# Patient Record
Sex: FEMALE | Race: OTHER | HISPANIC OR LATINO | Employment: FULL TIME | URBAN - METROPOLITAN AREA
[De-identification: names, ages, dates, MRNs, and addresses within clinical notes are randomized per-mention and may not be internally consistent; named-entity substitution may affect disease eponyms.]

---

## 2018-07-10 ENCOUNTER — HOSPITAL ENCOUNTER (INPATIENT)
Facility: HOSPITAL | Age: 38
LOS: 2 days | Discharge: HOME | DRG: 463 | End: 2018-07-12
Attending: EMERGENCY MEDICINE | Admitting: UROLOGY
Payer: COMMERCIAL

## 2018-07-10 ENCOUNTER — APPOINTMENT (EMERGENCY)
Dept: RADIOLOGY | Facility: HOSPITAL | Age: 38
DRG: 463 | End: 2018-07-10
Payer: COMMERCIAL

## 2018-07-10 DIAGNOSIS — N20.0 LEFT NEPHROLITHIASIS: Primary | ICD-10-CM

## 2018-07-10 DIAGNOSIS — N20.1 LEFT URETERAL STONE: ICD-10-CM

## 2018-07-10 DIAGNOSIS — N39.0 UTI (URINARY TRACT INFECTION): ICD-10-CM

## 2018-07-10 DIAGNOSIS — N13.30 HYDRONEPHROSIS: ICD-10-CM

## 2018-07-10 LAB
ALBUMIN SERPL BCP-MCNC: 3.9 G/DL (ref 3.5–5)
ALP SERPL-CCNC: 82 U/L (ref 46–116)
ALT SERPL W P-5'-P-CCNC: 20 U/L (ref 12–78)
ANION GAP SERPL CALCULATED.3IONS-SCNC: 3 MMOL/L (ref 4–13)
AST SERPL W P-5'-P-CCNC: 14 U/L (ref 5–45)
BACTERIA UR QL AUTO: ABNORMAL /HPF
BASOPHILS # BLD AUTO: 0.03 THOUSANDS/ΜL (ref 0–0.1)
BASOPHILS NFR BLD AUTO: 0 % (ref 0–1)
BILIRUB SERPL-MCNC: 0.47 MG/DL (ref 0.2–1)
BILIRUB UR QL STRIP: NEGATIVE
BUN SERPL-MCNC: 17 MG/DL (ref 5–25)
CALCIUM SERPL-MCNC: 9.5 MG/DL (ref 8.3–10.1)
CHLORIDE SERPL-SCNC: 102 MMOL/L (ref 100–108)
CLARITY UR: CLEAR
CO2 SERPL-SCNC: 27 MMOL/L (ref 21–32)
COLOR UR: YELLOW
COLOR, POC: NORMAL
CREAT SERPL-MCNC: 1.52 MG/DL (ref 0.6–1.3)
EOSINOPHIL # BLD AUTO: 0.05 THOUSAND/ΜL (ref 0–0.61)
EOSINOPHIL NFR BLD AUTO: 0 % (ref 0–6)
ERYTHROCYTE [DISTWIDTH] IN BLOOD BY AUTOMATED COUNT: 13.2 % (ref 11.6–15.1)
EXT PREG TEST URINE: NORMAL
GFR SERPL CREATININE-BSD FRML MDRD: 43 ML/MIN/1.73SQ M
GLUCOSE SERPL-MCNC: 101 MG/DL (ref 65–140)
GLUCOSE UR STRIP-MCNC: NEGATIVE MG/DL
HCT VFR BLD AUTO: 45.1 % (ref 34.8–46.1)
HGB BLD-MCNC: 14.5 G/DL (ref 11.5–15.4)
HGB UR QL STRIP.AUTO: ABNORMAL
IMM GRANULOCYTES # BLD AUTO: 0.02 THOUSAND/UL (ref 0–0.2)
IMM GRANULOCYTES NFR BLD AUTO: 0 % (ref 0–2)
KETONES UR STRIP-MCNC: NEGATIVE MG/DL
LEUKOCYTE ESTERASE UR QL STRIP: ABNORMAL
LYMPHOCYTES # BLD AUTO: 1.03 THOUSANDS/ΜL (ref 0.6–4.47)
LYMPHOCYTES NFR BLD AUTO: 9 % (ref 14–44)
MCH RBC QN AUTO: 29.7 PG (ref 26.8–34.3)
MCHC RBC AUTO-ENTMCNC: 32.2 G/DL (ref 31.4–37.4)
MCV RBC AUTO: 92 FL (ref 82–98)
MONOCYTES # BLD AUTO: 0.75 THOUSAND/ΜL (ref 0.17–1.22)
MONOCYTES NFR BLD AUTO: 6 % (ref 4–12)
NEUTROPHILS # BLD AUTO: 9.75 THOUSANDS/ΜL (ref 1.85–7.62)
NEUTS SEG NFR BLD AUTO: 85 % (ref 43–75)
NITRITE UR QL STRIP: POSITIVE
NON-SQ EPI CELLS URNS QL MICRO: ABNORMAL /HPF
NRBC BLD AUTO-RTO: 0 /100 WBCS
PH UR STRIP.AUTO: 6 [PH] (ref 4.5–8)
PLATELET # BLD AUTO: 237 THOUSANDS/UL (ref 149–390)
PMV BLD AUTO: 10.4 FL (ref 8.9–12.7)
POTASSIUM SERPL-SCNC: 3.7 MMOL/L (ref 3.5–5.3)
PROT SERPL-MCNC: 8.9 G/DL (ref 6.4–8.2)
PROT UR STRIP-MCNC: ABNORMAL MG/DL
RBC # BLD AUTO: 4.89 MILLION/UL (ref 3.81–5.12)
RBC #/AREA URNS AUTO: ABNORMAL /HPF
SODIUM SERPL-SCNC: 132 MMOL/L (ref 136–145)
SP GR UR STRIP.AUTO: 1.02 (ref 1–1.03)
UROBILINOGEN UR QL STRIP.AUTO: 0.2 E.U./DL
WBC # BLD AUTO: 11.63 THOUSAND/UL (ref 4.31–10.16)
WBC #/AREA URNS AUTO: ABNORMAL /HPF

## 2018-07-10 PROCEDURE — 87186 SC STD MICRODIL/AGAR DIL: CPT

## 2018-07-10 PROCEDURE — 81025 URINE PREGNANCY TEST: CPT | Performed by: EMERGENCY MEDICINE

## 2018-07-10 PROCEDURE — 99285 EMERGENCY DEPT VISIT HI MDM: CPT

## 2018-07-10 PROCEDURE — 80053 COMPREHEN METABOLIC PANEL: CPT | Performed by: EMERGENCY MEDICINE

## 2018-07-10 PROCEDURE — 81001 URINALYSIS AUTO W/SCOPE: CPT

## 2018-07-10 PROCEDURE — 85025 COMPLETE CBC W/AUTO DIFF WBC: CPT | Performed by: EMERGENCY MEDICINE

## 2018-07-10 PROCEDURE — 71046 X-RAY EXAM CHEST 2 VIEWS: CPT

## 2018-07-10 PROCEDURE — 96374 THER/PROPH/DIAG INJ IV PUSH: CPT

## 2018-07-10 PROCEDURE — 87147 CULTURE TYPE IMMUNOLOGIC: CPT

## 2018-07-10 PROCEDURE — 74176 CT ABD & PELVIS W/O CONTRAST: CPT

## 2018-07-10 PROCEDURE — 87086 URINE CULTURE/COLONY COUNT: CPT

## 2018-07-10 PROCEDURE — 99254 IP/OBS CNSLTJ NEW/EST MOD 60: CPT | Performed by: UROLOGY

## 2018-07-10 PROCEDURE — 96375 TX/PRO/DX INJ NEW DRUG ADDON: CPT

## 2018-07-10 PROCEDURE — 36415 COLL VENOUS BLD VENIPUNCTURE: CPT | Performed by: EMERGENCY MEDICINE

## 2018-07-10 PROCEDURE — 87077 CULTURE AEROBIC IDENTIFY: CPT

## 2018-07-10 RX ORDER — MORPHINE SULFATE 4 MG/ML
4 INJECTION, SOLUTION INTRAMUSCULAR; INTRAVENOUS ONCE
Status: COMPLETED | OUTPATIENT
Start: 2018-07-10 | End: 2018-07-10

## 2018-07-10 RX ORDER — KETOROLAC TROMETHAMINE 30 MG/ML
30 INJECTION, SOLUTION INTRAMUSCULAR; INTRAVENOUS ONCE
Status: DISCONTINUED | OUTPATIENT
Start: 2018-07-10 | End: 2018-07-10

## 2018-07-10 RX ORDER — ONDANSETRON 2 MG/ML
4 INJECTION INTRAMUSCULAR; INTRAVENOUS ONCE
Status: COMPLETED | OUTPATIENT
Start: 2018-07-10 | End: 2018-07-10

## 2018-07-10 RX ORDER — SODIUM CHLORIDE 9 MG/ML
100 INJECTION, SOLUTION INTRAVENOUS CONTINUOUS
Status: DISCONTINUED | OUTPATIENT
Start: 2018-07-10 | End: 2018-07-12 | Stop reason: HOSPADM

## 2018-07-10 RX ORDER — KETOROLAC TROMETHAMINE 30 MG/ML
30 INJECTION, SOLUTION INTRAMUSCULAR; INTRAVENOUS ONCE
Status: COMPLETED | OUTPATIENT
Start: 2018-07-10 | End: 2018-07-10

## 2018-07-10 RX ORDER — IBUPROFEN 400 MG/1
400 TABLET ORAL EVERY 6 HOURS PRN
COMMUNITY

## 2018-07-10 RX ORDER — ONDANSETRON HYDROCHLORIDE 4 MG/5ML
4 SOLUTION ORAL ONCE
Status: DISCONTINUED | OUTPATIENT
Start: 2018-07-10 | End: 2018-07-10

## 2018-07-10 RX ORDER — ACETAMINOPHEN 325 MG/1
650 TABLET ORAL EVERY 6 HOURS PRN
Status: DISCONTINUED | OUTPATIENT
Start: 2018-07-10 | End: 2018-07-12 | Stop reason: HOSPADM

## 2018-07-10 RX ORDER — HYDROCODONE BITARTRATE AND ACETAMINOPHEN 5; 325 MG/1; MG/1
1 TABLET ORAL EVERY 6 HOURS PRN
Status: DISCONTINUED | OUTPATIENT
Start: 2018-07-10 | End: 2018-07-12 | Stop reason: HOSPADM

## 2018-07-10 RX ORDER — ONDANSETRON 2 MG/ML
INJECTION INTRAMUSCULAR; INTRAVENOUS
Status: COMPLETED
Start: 2018-07-10 | End: 2018-07-10

## 2018-07-10 RX ORDER — ONDANSETRON 2 MG/ML
4 INJECTION INTRAMUSCULAR; INTRAVENOUS EVERY 6 HOURS PRN
Status: DISCONTINUED | OUTPATIENT
Start: 2018-07-10 | End: 2018-07-12 | Stop reason: HOSPADM

## 2018-07-10 RX ORDER — SODIUM CHLORIDE, SODIUM LACTATE, POTASSIUM CHLORIDE, CALCIUM CHLORIDE 600; 310; 30; 20 MG/100ML; MG/100ML; MG/100ML; MG/100ML
100 INJECTION, SOLUTION INTRAVENOUS CONTINUOUS
Status: DISCONTINUED | OUTPATIENT
Start: 2018-07-10 | End: 2018-07-10

## 2018-07-10 RX ADMIN — HYDROCODONE BITARTRATE AND ACETAMINOPHEN 1 TABLET: 5; 325 TABLET ORAL at 20:02

## 2018-07-10 RX ADMIN — KETOROLAC TROMETHAMINE 30 MG: 30 INJECTION, SOLUTION INTRAMUSCULAR at 11:30

## 2018-07-10 RX ADMIN — CEFTRIAXONE 1000 MG: 1 INJECTION, POWDER, FOR SOLUTION INTRAMUSCULAR; INTRAVENOUS at 17:06

## 2018-07-10 RX ADMIN — SODIUM CHLORIDE, SODIUM LACTATE, POTASSIUM CHLORIDE, AND CALCIUM CHLORIDE 100 ML/HR: .6; .31; .03; .02 INJECTION, SOLUTION INTRAVENOUS at 17:04

## 2018-07-10 RX ADMIN — ONDANSETRON 4 MG: 2 INJECTION INTRAMUSCULAR; INTRAVENOUS at 11:30

## 2018-07-10 RX ADMIN — MORPHINE SULFATE 4 MG: 4 INJECTION INTRAVENOUS at 14:28

## 2018-07-10 RX ADMIN — SODIUM CHLORIDE 100 ML/HR: 0.9 INJECTION, SOLUTION INTRAVENOUS at 20:02

## 2018-07-10 NOTE — ED ATTENDING ATTESTATION
Cici Ferrell MD, saw and evaluated the patient  All available labs and X-rays were ordered by me or the resident and have been reviewed by myself  I discussed the patient with the resident / non-physician and agree with the resident's / non-physician practitioner's findings and plan as documented in the resident's / non-physician practicitioner's note, except where noted  At this point, I agree with the current assessment done in the ED  Chief Complaint   Patient presents with    Flank Pain     Patient reports nausea/vomiting and left sided flank pain for the past week  Patient has a h/o of kidney stones and states this feels similar  Patient also reports increased urinary frequency but denies dysuria/hematuria  This is a 28-year-old female presenting for evaluation of left flank pain  The patient states that the past 1 week she has been having nausea, vomiting, left flank pain  It has been very painful, rating anteriorly  Feels like previous kidney stone  Denies any fevers chills chest pain shortness of breath  Today because she was vomiting at work her boss sent her in for further evaluation  Denies diarrhea or constipation  Denies falls or injuries  Denies heavy lifting  She last had a kidney stone a long time ago and felt very similar  Denies any other major medical problems  PE:  Vitals:    07/10/18 1902 07/10/18 1903 07/10/18 2246 07/11/18 0728   BP: 114/66  119/66 117/63   BP Location: Right arm  Right arm Right arm   Pulse: 83  80 67   Resp: 16  16 16   Temp: 100 1 °F (37 8 °C)  98 9 °F (37 2 °C) 97 8 °F (36 6 °C)   TempSrc: Oral  Oral Oral   SpO2: 98%  97% 98%   Weight:  95 2 kg (209 lb 15 8 oz)     Height:  5' 1" (1 549 m)     General: VSS, NAD, awake, alert  Well-nourished, well-developed  Appears stated age  Speaking normally in full sentences  Head: Normocephalic, atraumatic, nontender  Eyes: PERRL, EOM-I  No diplopia  No hyphema     No subconjunctival hemorrhages  Symmetrical lids  ENT: Atraumatic external nose and ears  MMM  No malocclusion  No stridor  Normal phonation  No drooling  Normal swallowing  Neck: Symmetric, trachea midline  No JVD  CV: RRR  +S1/S2  No murmurs or gallops  Peripheral pulses +2 throughout  No chest wall tenderness  Lungs:   Unlabored No retractions  CTAB, lungs sounds equal bilateral    No tachypnea  Abd: +BS, soft, NT/ND    MSK:   FROM   Back:   No rashes  Left flank tenderness (mild)  Skin: Dry, intact  Neuro: AAOx3, GCS 15, CN II-XII grossly intact  Motor grossly intact  Psychiatric/Behavioral: Appropriate mood and affect   Exam: deferred  A:  - UTI  - Stone  P:  - CT for location of stone  - labs  - 13 point ROS was performed and all are normal unless stated in the history above  - Nursing note reviewed  Vitals reviewed  - Orders placed by myself and/or advanced practitioner / resident     - Previous chart was reviewed  - No language barrier    - History obtained from patient  - There are no limitations to the history obtained  - Critical care time: Not applicable for this patient  Final Diagnosis:  1  Left nephrolithiasis    2  UTI (urinary tract infection)    3  Hydronephrosis    4  Left ureteral stone      ED Course as of Jul 11 1255   Tue Jul 10, 2018   1104 Bacteria, UA: (!) Innumerable   1104 WBC, UA: (!) Innumerable   1104 Leukocytes, UA: (!) Small   1104 Nitrite, UA: (!) Positive   1233 Infected kidney stone with UTI and ZUHAIR  Will discuss with urology  Bert Goldberg Discussed case with Dr Henna Chambers  He will admit    Would expect >2 midnight stay in hospital for an infected kidney stone necessitating retrieval        Medications   sodium chloride 0 9 % infusion (100 mL/hr Intravenous New Bag 7/11/18 0717)   HYDROmorphone (DILAUDID) injection 1 mg (1 mg Intravenous Given 7/11/18 0719)   acetaminophen (TYLENOL) tablet 650 mg (not administered)   ceFAZolin (ANCEF) IVPB (premix) 2,000 mg (0 mg Intravenous Stopped 7/11/18 0921)   ondansetron (ZOFRAN) injection 4 mg (4 mg Intravenous Given 7/11/18 1058)   HYDROcodone-acetaminophen (NORCO) 5-325 mg per tablet 1 tablet (1 tablet Oral Given 7/11/18 1056)   ondansetron (ZOFRAN) injection 4 mg (4 mg Intravenous Given 7/10/18 1130)   ketorolac (TORADOL) injection 30 mg (30 mg Intravenous Given 7/10/18 1130)   morphine (PF) 4 mg/mL injection 4 mg (4 mg Intravenous Given 7/10/18 1428)   ceftriaxone (ROCEPHIN) 1 g/50 mL in dextrose IVPB (0 mg Intravenous Stopped 7/10/18 1809)     CT renal stone study abdomen pelvis wo contrast   ED Interpretation   CT ordered by myself and the report from radiologist has been reviewed  Final Result      5 mm calculus left UPJ with mild obstructive uropathy  No perinephric collection  5 mm and less left renal calyceal calculi  5 mm calculus right renal pelvis  No hydronephrosis  2 mm calculus upper pole right kidney  Mild stranding adjacent to right renal pelvis and proximal left ureter  There is minimal thickening of the bladder wall which may simply reflect under distention  Correlate with urinalysis to exclude nonspecific cystitis and ascending pyeloureteritis  Workstation performed: [id number]         XR chest 2 views   ED Interpretation   The CXR was ordered by resident and interpreted by me independently  On my read, it appears normal without acute abnormalities  Final Result      No acute cardiopulmonary disease  Workstation performed: [id number]           Orders Placed This Encounter   Procedures    Urine culture    XR chest 2 views    CT renal stone study abdomen pelvis wo contrast    Urine Microscopic    CBC and differential    Comprehensive metabolic panel    Diet NPO; Sips with meds    Nursing communcation Continue IV as ordered     Leda Swenson Notify admitting physician    Notify admitting physician on arrival    Nursing oxygen orders / instructions    Activity as tolerated  Bathroom privileges    Strain all urine    Activity as tolerated    Inpatient consult to Urology    Inpatient consult to Case Management    POCT pregnancy, urine    POCT urinalysis dipstick    Inpatient Admission (expected length of stay for this patient is greater than two midnights)    Inpatient Admission     Labs Reviewed   URINE MICROSCOPIC - Abnormal        Result Value Ref Range Status    RBC, UA 4-10 (*) None Seen, 0-5 /hpf Final    WBC, UA Innumerable (*) None Seen, 0-5, 5-55, 5-65 /hpf Final    Epithelial Cells Occasional  None Seen, Occasional /hpf Final    Bacteria, UA Innumerable (*) None Seen, Occasional /hpf Final   CBC AND DIFFERENTIAL - Abnormal     WBC 11 63 (*) 4 31 - 10 16 Thousand/uL Final    RBC 4 89  3 81 - 5 12 Million/uL Final    Hemoglobin 14 5  11 5 - 15 4 g/dL Final    Hematocrit 45 1  34 8 - 46 1 % Final    MCV 92  82 - 98 fL Final    MCH 29 7  26 8 - 34 3 pg Final    MCHC 32 2  31 4 - 37 4 g/dL Final    RDW 13 2  11 6 - 15 1 % Final    MPV 10 4  8 9 - 12 7 fL Final    Platelets 523  355 - 390 Thousands/uL Final    nRBC 0  /100 WBCs Final    Neutrophils Relative 85 (*) 43 - 75 % Final    Immat GRANS % 0  0 - 2 % Final    Lymphocytes Relative 9 (*) 14 - 44 % Final    Monocytes Relative 6  4 - 12 % Final    Eosinophils Relative 0  0 - 6 % Final    Basophils Relative 0  0 - 1 % Final    Neutrophils Absolute 9 75 (*) 1 85 - 7 62 Thousands/µL Final    Immature Grans Absolute 0 02  0 00 - 0 20 Thousand/uL Final    Lymphocytes Absolute 1 03  0 60 - 4 47 Thousands/µL Final    Monocytes Absolute 0 75  0 17 - 1 22 Thousand/µL Final    Eosinophils Absolute 0 05  0 00 - 0 61 Thousand/µL Final    Basophils Absolute 0 03  0 00 - 0 10 Thousands/µL Final   COMPREHENSIVE METABOLIC PANEL - Abnormal     Sodium 132 (*) 136 - 145 mmol/L Final    Potassium 3 7  3 5 - 5 3 mmol/L Final    Chloride 102  100 - 108 mmol/L Final    CO2 27  21 - 32 mmol/L Final    Anion Gap 3 (*) 4 - 13 mmol/L Final BUN 17  5 - 25 mg/dL Final    Creatinine 1 52 (*) 0 60 - 1 30 mg/dL Final    Comment: Standardized to IDMS reference method    Glucose 101  65 - 140 mg/dL Final    Comment:   If the patient is fasting, the ADA then defines impaired fasting glucose as > 100 mg/dL and diabetes as > or equal to 123 mg/dL  Specimen collection should occur prior to Sulfasalazine administration due to the potential for falsely depressed results  Specimen collection should occur prior to Sulfapyridine administration due to the potential for falsely elevated results  Calcium 9 5  8 3 - 10 1 mg/dL Final    AST 14  5 - 45 U/L Final    Comment:   Specimen collection should occur prior to Sulfasalazine administration due to the potential for falsely depressed results  ALT 20  12 - 78 U/L Final    Comment:   Specimen collection should occur prior to Sulfasalazine and/or Sulfapyridine administration due to the potential for falsely depressed results  Alkaline Phosphatase 82  46 - 116 U/L Final    Total Protein 8 9 (*) 6 4 - 8 2 g/dL Final    Albumin 3 9  3 5 - 5 0 g/dL Final    Total Bilirubin 0 47  0 20 - 1 00 mg/dL Final    eGFR 43  ml/min/1 73sq m Final    Narrative:     National Kidney Disease Education Program recommendations are as follows:  GFR calculation is accurate only with a steady state creatinine  Chronic Kidney disease less than 60 ml/min/1 73 sq  meters  Kidney failure less than 15 ml/min/1 73 sq  meters     ED URINE MACROSCOPIC - Abnormal     Color, UA Yellow   Final    Clarity, UA Clear   Final    pH, UA 6 0  4 5 - 8 0 Final    Leukocytes, UA Small (*) Negative Final    Nitrite, UA Positive (*) Negative Final    Protein,  (2+) (*) Negative mg/dl Final    Glucose, UA Negative  Negative mg/dl Final    Ketones, UA Negative  Negative mg/dl Final    Urobilinogen, UA 0 2  0 2, 1 0 E U /dl E U /dl Final    Bilirubin, UA Negative  Negative Final    Blood, UA Large (*) Negative Final    Specific Gravity, UA 1 025  1 003 - 1 030 Final    Narrative:     CLINITEK RESULT   POCT PREGNANCY, URINE - Normal    EXT PREG TEST UR (Ref: Negative) negative (-)   Final   POCT URINALYSIS DIPSTICK - Normal    Color, UA see chart   Final     Time reflects when diagnosis was documented in both MDM as applicable and the Disposition within this note     Time User Action Codes Description Comment    7/10/2018  1:39 PM Taryn Allis Add [N20 0] Left nephrolithiasis     7/10/2018  1:39 PM Taryn Allis Add [N39 0] UTI (urinary tract infection)     7/10/2018  1:40 PM Taryn Allis Add [N13 30] Hydronephrosis     7/10/2018  4:46 PM Debra Castorena Add [N20 1] Left ureteral stone       ED Disposition     ED Disposition Condition Comment    Admit  Case was discussed with Urology and the patient's admission status was agreed to be Admission Status: inpatient status to the service of Dr Lachelle Cruz  Follow-up Information    None       Current Discharge Medication List      CONTINUE these medications which have NOT CHANGED    Details   ibuprofen (MOTRIN) 400 mg tablet Take 400 mg by mouth every 6 (six) hours as needed for mild pain (takes 1-2 as needed)           No discharge procedures on file  Prior to Admission Medications   Prescriptions Last Dose Informant Patient Reported? Taking?   ibuprofen (MOTRIN) 400 mg tablet   Yes Yes   Sig: Take 400 mg by mouth every 6 (six) hours as needed for mild pain (takes 1-2 as needed)      Facility-Administered Medications: None       Portions of the record may have been created with voice recognition software  Occasional wrong word or "sound a like" substitutions may have occurred due to the inherent limitations of voice recognition software  Read the chart carefully and recognize, using context, where substitutions have occurred      Electronically signed by:  Beverley Mcnair

## 2018-07-10 NOTE — CONSULTS
Consultation - Urology   Elo Morales 45 y o  female MRN: [id number]  Unit/Bed#: ED 25 Encounter: [id number]      Assessment/Plan      Assessment:  5 mm proximal UPJ stone on the left, with probable UTI    Plan:  Plan cystoscopy, left ureteral stent placement as staged procedure tomorrow  NPO after midnight  I have explained to her the procedure, the risks of bleeding infection and damage to the urinary tract, and the fact that this is a staged procedure and she will need further ureteroscopy and laser lithotripsy after the infection is cleared  This for decompression only  She has signed informed consent  History of Present Illness   Attending: Ana Paula Brown MD  Reason for Consult / Principal Problem:  Left flank pain, left ureteral calculus  HPI: Elo Morales is a 45y o  year old female who presents with left flank pain  She has had nausea and vomiting and left flank pain for the past week  She has a history of kidney stones  CT scan shows upon my personal review a 5 mm stone in the left UPJ, some stones in the left kidney that are 5 mm and less in size, and a 5 mm nonobstructing stone in the right renal pelvis  Urinalysis shows positive leukocytes, positive nitrites, innumerable bacteria and white blood cells indicative of a urinary tract infection      Inpatient consult to Urology  Consult performed by: Manish Dural  Consult ordered by: Layla Emanuel          Review of Systems    Historical Information   Past Medical History:   Diagnosis Date    Kidney stones      Past Surgical History:   Procedure Laterality Date    TUBAL LIGATION       Social History   History   Alcohol Use No     History   Drug Use No     History   Smoking Status    Current Every Day Smoker    Packs/day: 1 00    Types: Cigarettes   Smokeless Tobacco    Never Used     Family History: non-contributory    Meds/Allergies   all current active meds have been reviewed, current meds:   No current facility-administered medications for this encounter  and PTA meds:   Prior to Admission Medications   Prescriptions Last Dose Informant Patient Reported? Taking?   ibuprofen (MOTRIN) 400 mg tablet   Yes Yes   Sig: Take 400 mg by mouth every 6 (six) hours as needed for mild pain (takes 1-2 as needed)      Facility-Administered Medications: None     No Known Allergies    Objective   Vitals: Blood pressure 109/67, pulse 64, temperature 99 1 °F (37 3 °C), temperature source Tympanic, resp  rate 16, weight 99 8 kg (220 lb), last menstrual period 07/03/2018, SpO2 98 %  No intake/output data recorded  Invasive Devices     Peripheral Intravenous Line            Peripheral IV 07/10/18 Left Antecubital less than 1 day                Physical Exam   Constitutional: She is oriented to person, place, and time  She appears well-developed and well-nourished  HENT:   Head: Normocephalic and atraumatic  Eyes: EOM are normal    Neck: Normal range of motion  Cardiovascular: Normal rate and regular rhythm  Pulmonary/Chest: Effort normal  No respiratory distress  She has no wheezes  Abdominal: She exhibits no distension  There is no tenderness  Musculoskeletal: Normal range of motion  Neurological: She is alert and oriented to person, place, and time  Skin: Skin is warm and dry  Psychiatric: She has a normal mood and affect  Her behavior is normal  Judgment and thought content normal        Lab Results:   I have personally reviewed pertinent reports      CBC:   Lab Results   Component Value Date    WBC 11 63 (H) 07/10/2018    HGB 14 5 07/10/2018    HCT 45 1 07/10/2018    MCV 92 07/10/2018     07/10/2018    MCH 29 7 07/10/2018    MCHC 32 2 07/10/2018    RDW 13 2 07/10/2018    MPV 10 4 07/10/2018    NRBC 0 07/10/2018     CMP:   Lab Results   Component Value Date     (L) 07/10/2018     07/10/2018    CO2 27 07/10/2018    ANIONGAP 3 (L) 07/10/2018    BUN 17 07/10/2018    CREATININE 1 52 (H) 07/10/2018    GLUCOSE 101 07/10/2018    CALCIUM 9 5 07/10/2018    AST 14 07/10/2018    ALT 20 07/10/2018    ALKPHOS 82 07/10/2018    PROT 8 9 (H) 07/10/2018    BILITOT 0 47 07/10/2018    EGFR 43 07/10/2018     Imaging Studies: I have personally reviewed pertinent reports  and I have personally reviewed pertinent films in PACS  EKG, Pathology, and Other Studies: I have personally reviewed pertinent reports      VTE Prophylaxis: Sequential compression device Zeinab An)     Code Status: No Order  Advance Directive and Living Will:      Power of :    POLST:

## 2018-07-10 NOTE — ED PROVIDER NOTES
History  Chief Complaint   Patient presents with    Flank Pain     Patient reports nausea/vomiting and left sided flank pain for the past week  Patient has a h/o of kidney stones and states this feels similar  Patient also reports increased urinary frequency but denies dysuria/hematuria  Patient is a 45year old female with a history of kidney stones requiring urological retrieval who presents with L sided flank pain  Patient tells me 5 days ago she started to experience L-sided cramping non-radiating flank pain  The past two days she has had associated nausea and vomiting, subjective fevers/chills, urinary urgency, urinary frequency  She denies dysuria and visible hematuria  She has had decreased PO intake the past several days  She has also been experiencing URI symptoms the past week including non-productive cough, dyspnea, and rhinorrhea  She denies chest pain  She tells me there is a chance there is a retained tampon in her vaginal vault from last week  Denies any foul-smelling dischrage  Prior to Admission Medications   Prescriptions Last Dose Informant Patient Reported? Taking?   ibuprofen (MOTRIN) 400 mg tablet   Yes Yes   Sig: Take 400 mg by mouth every 6 (six) hours as needed for mild pain (takes 1-2 as needed)      Facility-Administered Medications: None       Past Medical History:   Diagnosis Date    Kidney stones        Past Surgical History:   Procedure Laterality Date    TUBAL LIGATION         History reviewed  No pertinent family history  I have reviewed and agree with the history as documented  Social History   Substance Use Topics    Smoking status: Current Every Day Smoker     Packs/day: 1 00     Types: Cigarettes    Smokeless tobacco: Never Used    Alcohol use No        Review of Systems   Constitutional: Positive for appetite change, chills and fever  Negative for activity change, diaphoresis and fatigue  HENT: Positive for congestion      Eyes: Negative for photophobia and redness  Respiratory: Positive for cough and shortness of breath  Negative for chest tightness and wheezing  Cardiovascular: Negative for chest pain  Gastrointestinal: Positive for nausea and vomiting  Negative for abdominal distention, abdominal pain and diarrhea  Genitourinary: Positive for flank pain, frequency and urgency  Negative for dysuria, hematuria and pelvic pain  Skin: Negative for color change, pallor and rash  Neurological: Negative for weakness, light-headedness and numbness  Psychiatric/Behavioral: Negative for agitation and confusion  All other systems reviewed and are negative  Physical Exam  ED Triage Vitals [07/10/18 0939]   Temperature Pulse Respirations Blood Pressure SpO2   99 1 °F (37 3 °C) 94 18 124/74 98 %      Temp Source Heart Rate Source Patient Position - Orthostatic VS BP Location FiO2 (%)   Tympanic Monitor Sitting Right arm --      Pain Score       9           Orthostatic Vital Signs  Vitals:    07/10/18 1430 07/10/18 1532 07/10/18 1600 07/10/18 1707   BP: 111/64 109/67 108/63 103/67   Pulse: 71 64 84 81   Patient Position - Orthostatic VS:    Sitting       Physical Exam   Constitutional: She is oriented to person, place, and time  She appears well-developed and well-nourished  No distress  HENT:   Head: Normocephalic  Eyes: Pupils are equal, round, and reactive to light  Neck: Normal range of motion  Neck supple  Cardiovascular: Normal rate, regular rhythm, normal heart sounds and intact distal pulses  Pulmonary/Chest: Effort normal and breath sounds normal    Abdominal: Soft  Bowel sounds are normal  She exhibits no distension  There is no tenderness  There is no guarding  Musculoskeletal: Normal range of motion  She exhibits no edema, tenderness or deformity  L CVA Tenderness   Neurological: She is alert and oriented to person, place, and time  No cranial nerve deficit or sensory deficit  Skin: Skin is warm and dry   Capillary refill takes less than 2 seconds  Psychiatric: She has a normal mood and affect  Her behavior is normal  Judgment and thought content normal        ED Medications  Medications   ceftriaxone (ROCEPHIN) 1 g/50 mL in dextrose IVPB (1,000 mg Intravenous New Bag 7/10/18 1706)   HYDROmorphone (DILAUDID) injection 1 mg (not administered)   lactated ringers infusion (100 mL/hr Intravenous New Bag 7/10/18 1704)   acetaminophen (TYLENOL) tablet 650 mg (not administered)   ceFAZolin (ANCEF) IVPB (premix) 2,000 mg (not administered)   ondansetron (ZOFRAN) injection 4 mg (not administered)   HYDROcodone-acetaminophen (NORCO) 5-325 mg per tablet 1 tablet (not administered)   ondansetron (ZOFRAN) injection 4 mg (4 mg Intravenous Given 7/10/18 1130)   ketorolac (TORADOL) injection 30 mg (30 mg Intravenous Given 7/10/18 1130)   morphine (PF) 4 mg/mL injection 4 mg (4 mg Intravenous Given 7/10/18 1428)       Diagnostic Studies  Results Reviewed     Procedure Component Value Units Date/Time    Comprehensive metabolic panel [[id number]]  (Abnormal) Collected:  07/10/18 1127    Lab Status:  Final result Specimen:  Blood from Arm, Left Updated:  07/10/18 1158     Sodium 132 (L) mmol/L      Potassium 3 7 mmol/L      Chloride 102 mmol/L      CO2 27 mmol/L      Anion Gap 3 (L) mmol/L      BUN 17 mg/dL      Creatinine 1 52 (H) mg/dL      Glucose 101 mg/dL      Calcium 9 5 mg/dL      AST 14 U/L      ALT 20 U/L      Alkaline Phosphatase 82 U/L      Total Protein 8 9 (H) g/dL      Albumin 3 9 g/dL      Total Bilirubin 0 47 mg/dL      eGFR 43 ml/min/1 73sq m     Narrative:         National Kidney Disease Education Program recommendations are as follows:  GFR calculation is accurate only with a steady state creatinine  Chronic Kidney disease less than 60 ml/min/1 73 sq  meters  Kidney failure less than 15 ml/min/1 73 sq  meters      CBC and differential [[id number]]  (Abnormal) Collected:  07/10/18 1127    Lab Status:  Final result Specimen:  Blood from Arm, Left Updated:  07/10/18 1139     WBC 11 63 (H) Thousand/uL      RBC 4 89 Million/uL      Hemoglobin 14 5 g/dL      Hematocrit 45 1 %      MCV 92 fL      MCH 29 7 pg      MCHC 32 2 g/dL      RDW 13 2 %      MPV 10 4 fL      Platelets 263 Thousands/uL      nRBC 0 /100 WBCs      Neutrophils Relative 85 (H) %      Immat GRANS % 0 %      Lymphocytes Relative 9 (L) %      Monocytes Relative 6 %      Eosinophils Relative 0 %      Basophils Relative 0 %      Neutrophils Absolute 9 75 (H) Thousands/µL      Immature Grans Absolute 0 02 Thousand/uL      Lymphocytes Absolute 1 03 Thousands/µL      Monocytes Absolute 0 75 Thousand/µL      Eosinophils Absolute 0 05 Thousand/µL      Basophils Absolute 0 03 Thousands/µL     Urine Microscopic [[id number]]  (Abnormal) Collected:  07/10/18 1003    Lab Status:  Final result Specimen:  Urine from Urine, Clean Catch Updated:  07/10/18 1030     RBC, UA 4-10 (A) /hpf      WBC, UA Innumerable (A) /hpf      Epithelial Cells Occasional /hpf      Bacteria, UA Innumerable (A) /hpf     Urine culture [[id number]] Collected:  07/10/18 1003    Lab Status:   In process Specimen:  Urine from Urine, Clean Catch Updated:  07/10/18 1030    POCT pregnancy, urine [[id number]]  (Normal) Resulted:  07/10/18 0956    Lab Status:  Final result Updated:  07/10/18 0956     EXT PREG TEST UR (Ref: Negative) negative (-)    POCT urinalysis dipstick [[id number]]  (Normal) Resulted:  07/10/18 0956    Lab Status:  Final result Updated:  07/10/18 0956     Color, UA see chart    ED Urine Macroscopic [[id number]]  (Abnormal) Collected:  07/10/18 1003    Lab Status:  Final result Specimen:  Urine Updated:  07/10/18 0954     Color, UA Yellow     Clarity, UA Clear     pH, UA 6 0     Leukocytes, UA Small (A)     Nitrite, UA Positive (A)     Protein,  (2+) (A) mg/dl      Glucose, UA Negative mg/dl      Ketones, UA Negative mg/dl      Urobilinogen, UA 0 2 E U /dl      Bilirubin, UA Negative     Blood, UA Large (A) Specific [place], UA 1 025    Narrative:       CLINITEK RESULT                 CT renal stone study abdomen pelvis wo contrast   ED Interpretation by Dionna Oneal MD (07/10 1528)   CT ordered by myself and the report from radiologist has been reviewed  Final Result by Chelsea Stalilngs MD (07/10 1202)      5 mm calculus left UPJ with mild obstructive uropathy  No perinephric collection  5 mm and less left renal calyceal calculi  5 mm calculus right renal pelvis  No hydronephrosis  2 mm calculus upper pole right kidney  Mild stranding adjacent to right renal pelvis and proximal left ureter  There is minimal thickening of the bladder wall which may simply reflect under distention  Correlate with urinalysis to exclude nonspecific cystitis and ascending pyeloureteritis  Workstation performed: [id number]         XR chest 2 views   ED Interpretation by Dionna Oneal MD (07/10 1528)   The CXR was ordered by resident and interpreted by me independently  On my read, it appears normal without acute abnormalities  Final Result by Harriett Landon MD (07/10 1250)      No acute cardiopulmonary disease  Workstation performed: [id number]               Procedures  Procedures      Phone Consults  ED Phone Contact    ED Course  ED Course as of Jul 10 1724   Tue Jul 10, 2018   1112 Passed PERC criteria     1205 ZUHAIR   Creatinine: (!) 1 52   1438 CXR read as normal    1442 Awaiting urology consult                                MDM  Number of Diagnoses or Management Options  Hydronephrosis:   Left nephrolithiasis:   UTI (urinary tract infection):   Diagnosis management comments: Patient is a 45year old female who presents with infected obstructive left-side stone at the UPJ  Urology consulted  They will admit for ureteral stent placement tomorrow  Patient was stable throughout her stay in the ER      Nausea is well-controlled on Zofran and analgesia is adequate with morphine and toradol  Amount and/or Complexity of Data Reviewed  Clinical lab tests: reviewed  Tests in the radiology section of CPT®: reviewed  Tests in the medicine section of CPT®: reviewed    Risk of Complications, Morbidity, and/or Mortality  Presenting problems: moderate  Diagnostic procedures: minimal  Management options: low    Patient Progress  Patient progress: stable    CritCare Time    Disposition  Final diagnoses:   Left nephrolithiasis   UTI (urinary tract infection)   Hydronephrosis     Time reflects when diagnosis was documented in both MDM as applicable and the Disposition within this note     Time User Action Codes Description Comment    7/10/2018  1:39 PM Izzy Bosch Add [N20 0] Left nephrolithiasis     7/10/2018  1:39 PM Stephani Eason Add [N39 0] UTI (urinary tract infection)     7/10/2018  1:40 PM Izzy Bosch Add [N13 30] Hydronephrosis     7/10/2018  4:46 PM Rudolm Brunner Add [N20 1] Left ureteral stone       ED Disposition     ED Disposition Condition Comment    Admit  Case was discussed with Urology and the patient's admission status was agreed to be Admission Status: inpatient status to the service of Dr Barbara Nichols  Follow-up Information    None         Patient's Medications   Discharge Prescriptions    No medications on file     No discharge procedures on file  ED Provider  Attending physically available and evaluated Eduardo Darling I managed the patient along with the ED Attending      Electronically Signed by         Kyler Luna MD  07/10/18 4405

## 2018-07-10 NOTE — ED NOTES
Spoke to [employer]  Asked to take pt's food upstairs        [employer] [facility], RN  07/10/18 9975

## 2018-07-10 NOTE — Clinical Note
Case was discussed with Urology and the patient's admission status was agreed to be Admission Status: inpatient status to the service of

## 2018-07-10 NOTE — LETTER
[address] [address]  Dept: [phone]    July 12, 2018     Patient: Sylvester Ervin   YOB: 1980   Date of Visit: 7/10/2018       To Whom it May Concern:    Sylvester Ervin is under my professional care  She was seen in the hospital from 7/10/2018   to 07/12/18  She may return to work with limitations Monday July 16th   No heavy lifting> 20lbs       If you have any questions or concerns, please don't hesitate to call           Sincerely,          JEFFY Kauffman

## 2018-07-10 NOTE — ED NOTES
Per Dr Bashir Solitario, hold off on first nurse workup at this time        Barbara Salvador RN  07/10/18 5979

## 2018-07-10 NOTE — ED NOTES
Has history of kidney stones x 2   Has been taking motrin without any relief  Voiding frequently no burning or smell       Sunshine Campbell RN  07/10/18 9815

## 2018-07-11 ENCOUNTER — ANESTHESIA (INPATIENT)
Dept: PERIOP | Facility: HOSPITAL | Age: 38
DRG: 463 | End: 2018-07-11
Payer: COMMERCIAL

## 2018-07-11 ENCOUNTER — ANESTHESIA EVENT (INPATIENT)
Dept: PERIOP | Facility: HOSPITAL | Age: 38
DRG: 463 | End: 2018-07-11
Payer: COMMERCIAL

## 2018-07-11 ENCOUNTER — APPOINTMENT (INPATIENT)
Dept: RADIOLOGY | Facility: HOSPITAL | Age: 38
DRG: 463 | End: 2018-07-11
Payer: COMMERCIAL

## 2018-07-11 PROCEDURE — 0T778DZ DILATION OF LEFT URETER WITH INTRALUMINAL DEVICE, VIA NATURAL OR ARTIFICIAL OPENING ENDOSCOPIC: ICD-10-PCS | Performed by: UROLOGY

## 2018-07-11 PROCEDURE — 52332 CYSTOSCOPY AND TREATMENT: CPT | Performed by: UROLOGY

## 2018-07-11 PROCEDURE — 74018 RADEX ABDOMEN 1 VIEW: CPT

## 2018-07-11 PROCEDURE — C1769 GUIDE WIRE: HCPCS | Performed by: UROLOGY

## 2018-07-11 PROCEDURE — C2617 STENT, NON-COR, TEM W/O DEL: HCPCS | Performed by: UROLOGY

## 2018-07-11 PROCEDURE — 99232 SBSQ HOSP IP/OBS MODERATE 35: CPT | Performed by: NURSE PRACTITIONER

## 2018-07-11 DEVICE — STENT URETERAL 6 FR 26CM INLAY OPTIMA
Type: IMPLANTABLE DEVICE | Status: NON-FUNCTIONAL
Removed: 2018-08-01

## 2018-07-11 RX ORDER — ONDANSETRON 2 MG/ML
4 INJECTION INTRAMUSCULAR; INTRAVENOUS ONCE AS NEEDED
Status: DISCONTINUED | OUTPATIENT
Start: 2018-07-11 | End: 2018-07-12 | Stop reason: HOSPADM

## 2018-07-11 RX ORDER — FENTANYL CITRATE/PF 50 MCG/ML
25 SYRINGE (ML) INJECTION
Status: DISCONTINUED | OUTPATIENT
Start: 2018-07-11 | End: 2018-07-11 | Stop reason: HOSPADM

## 2018-07-11 RX ORDER — FENTANYL CITRATE/PF 50 MCG/ML
25 SYRINGE (ML) INJECTION
Status: DISCONTINUED | OUTPATIENT
Start: 2018-07-11 | End: 2018-07-12 | Stop reason: HOSPADM

## 2018-07-11 RX ORDER — SODIUM CHLORIDE, SODIUM LACTATE, POTASSIUM CHLORIDE, CALCIUM CHLORIDE 600; 310; 30; 20 MG/100ML; MG/100ML; MG/100ML; MG/100ML
INJECTION, SOLUTION INTRAVENOUS CONTINUOUS PRN
Status: DISCONTINUED | OUTPATIENT
Start: 2018-07-11 | End: 2018-07-11 | Stop reason: SURG

## 2018-07-11 RX ORDER — LIDOCAINE HYDROCHLORIDE 10 MG/ML
INJECTION, SOLUTION INFILTRATION; PERINEURAL AS NEEDED
Status: DISCONTINUED | OUTPATIENT
Start: 2018-07-11 | End: 2018-07-11 | Stop reason: SURG

## 2018-07-11 RX ORDER — PROPOFOL 10 MG/ML
INJECTION, EMULSION INTRAVENOUS AS NEEDED
Status: DISCONTINUED | OUTPATIENT
Start: 2018-07-11 | End: 2018-07-11 | Stop reason: SURG

## 2018-07-11 RX ORDER — ONDANSETRON 2 MG/ML
4 INJECTION INTRAMUSCULAR; INTRAVENOUS ONCE AS NEEDED
Status: DISCONTINUED | OUTPATIENT
Start: 2018-07-11 | End: 2018-07-11 | Stop reason: HOSPADM

## 2018-07-11 RX ORDER — MAGNESIUM HYDROXIDE 1200 MG/15ML
LIQUID ORAL AS NEEDED
Status: DISCONTINUED | OUTPATIENT
Start: 2018-07-11 | End: 2018-07-11 | Stop reason: HOSPADM

## 2018-07-11 RX ORDER — OXYBUTYNIN CHLORIDE 5 MG/1
5 TABLET ORAL 3 TIMES DAILY PRN
Status: DISCONTINUED | OUTPATIENT
Start: 2018-07-11 | End: 2018-07-12 | Stop reason: HOSPADM

## 2018-07-11 RX ORDER — ONDANSETRON 2 MG/ML
INJECTION INTRAMUSCULAR; INTRAVENOUS AS NEEDED
Status: DISCONTINUED | OUTPATIENT
Start: 2018-07-11 | End: 2018-07-11 | Stop reason: SURG

## 2018-07-11 RX ADMIN — DEXAMETHASONE SODIUM PHOSPHATE 10 MG: 10 INJECTION INTRAMUSCULAR; INTRAVENOUS at 18:23

## 2018-07-11 RX ADMIN — LIDOCAINE HYDROCHLORIDE 50 MG: 10 INJECTION, SOLUTION INFILTRATION; PERINEURAL at 18:23

## 2018-07-11 RX ADMIN — HYDROMORPHONE HYDROCHLORIDE 1 MG: 1 INJECTION, SOLUTION INTRAMUSCULAR; INTRAVENOUS; SUBCUTANEOUS at 23:18

## 2018-07-11 RX ADMIN — ONDANSETRON 4 MG: 2 INJECTION INTRAMUSCULAR; INTRAVENOUS at 10:58

## 2018-07-11 RX ADMIN — CEFAZOLIN SODIUM 2000 MG: 2 SOLUTION INTRAVENOUS at 18:17

## 2018-07-11 RX ADMIN — CEFAZOLIN SODIUM 2000 MG: 2 SOLUTION INTRAVENOUS at 16:40

## 2018-07-11 RX ADMIN — CEFAZOLIN SODIUM 2000 MG: 2 SOLUTION INTRAVENOUS at 00:46

## 2018-07-11 RX ADMIN — HYDROCODONE BITARTRATE AND ACETAMINOPHEN 1 TABLET: 5; 325 TABLET ORAL at 10:56

## 2018-07-11 RX ADMIN — CEFAZOLIN SODIUM 2000 MG: 2 SOLUTION INTRAVENOUS at 08:44

## 2018-07-11 RX ADMIN — OXYBUTYNIN CHLORIDE 5 MG: 5 TABLET ORAL at 23:18

## 2018-07-11 RX ADMIN — SODIUM CHLORIDE 100 ML/HR: 0.9 INJECTION, SOLUTION INTRAVENOUS at 07:17

## 2018-07-11 RX ADMIN — SODIUM CHLORIDE, SODIUM LACTATE, POTASSIUM CHLORIDE, AND CALCIUM CHLORIDE: .6; .31; .03; .02 INJECTION, SOLUTION INTRAVENOUS at 18:07

## 2018-07-11 RX ADMIN — HYDROCODONE BITARTRATE AND ACETAMINOPHEN 1 TABLET: 5; 325 TABLET ORAL at 02:32

## 2018-07-11 RX ADMIN — HYDROMORPHONE HYDROCHLORIDE 1 MG: 1 INJECTION, SOLUTION INTRAMUSCULAR; INTRAVENOUS; SUBCUTANEOUS at 16:39

## 2018-07-11 RX ADMIN — SODIUM CHLORIDE 100 ML/HR: 0.9 INJECTION, SOLUTION INTRAVENOUS at 19:03

## 2018-07-11 RX ADMIN — HYDROMORPHONE HYDROCHLORIDE 1 MG: 1 INJECTION, SOLUTION INTRAMUSCULAR; INTRAVENOUS; SUBCUTANEOUS at 07:19

## 2018-07-11 RX ADMIN — PROPOFOL 250 MG: 10 INJECTION, EMULSION INTRAVENOUS at 18:23

## 2018-07-11 RX ADMIN — ONDANSETRON 4 MG: 2 INJECTION INTRAMUSCULAR; INTRAVENOUS at 18:36

## 2018-07-11 RX ADMIN — ONDANSETRON 4 MG: 2 INJECTION INTRAMUSCULAR; INTRAVENOUS at 16:39

## 2018-07-11 NOTE — CASE MANAGEMENT
Initial Clinical Review    Thank you,  Rick Aqq  [facility] Utilization Review Department  Phone: [phone]; Fax [phone]  ATTENTION: The Network Utilization Review Department is now centralized for our 9 Facilities  Make a note that we have a new phone and fax numbers for our Department  Please call with any questions or concerns to [phone] and carefully follow the prompts so that you are directed to the right person  All voicemails are confidential  Fax any determinations, approvals, denials, and requests for initial or continue stay review clinical to [phone]  Due to HIGH CALL volume, it would be easier if you could please send faxed requests to expedite your requests and in part, help us provide discharge notifications faster  Admission: Date/Time/Statement: 7/10/18 @ [phone]     Orders Placed This Encounter   Procedures    Inpatient Admission (expected length of stay for this patient is greater than two midnights)     Standing Status:   Standing     Number of Occurrences:   1     Order Specific Question:   Admitting Physician     Answer:   Cindy Quiñonez [[id number]]     Order Specific Question:   Level of Care     Answer:   Med Surg [16]     Order Specific Question:   Estimated length of stay     Answer:   More than 2 Midnights     Order Specific Question:   Certification     Answer:   I certify that inpatient services are medically necessary for this patient for a duration of greater than two midnights  See H&P and MD Progress Notes for additional information about the patient's course of treatment         ED: Date/Time/Mode of Arrival:   ED Arrival Information     Expected Arrival Acuity Means of Arrival Escorted By Service Admission Type    - 7/10/2018 09:30 Urgent Walk-In Self Urology Urgent    Arrival Complaint    Flank Pain          Chief Complaint:   Chief Complaint   Patient presents with    Flank Pain     Patient reports nausea/vomiting and left sided flank pain for the past week  Patient has a h/o of kidney stones and states this feels similar  Patient also reports increased urinary frequency but denies dysuria/hematuria  History of Illness: Ofelia Nevarez is a 45y o  year old female who presents with left flank pain  She has had nausea and vomiting and left flank pain for the past week  She has a history of kidney stones  CT scan shows upon my personal review a 5 mm stone in the left UPJ, some stones in the left kidney that are 5 mm and less in size, and a 5 mm nonobstructing stone in the right renal pelvis  Urinalysis shows positive leukocytes, positive nitrites, innumerable bacteria and white blood cells indicative of a urinary tract infection  ED Vital Signs:   ED Triage Vitals [07/10/18 0939]   Temperature Pulse Respirations Blood Pressure SpO2   99 1 °F (37 3 °C) 94 18 124/74 98 % RA sat       Temp Source Heart Rate Source Patient Position - Orthostatic VS BP Location FiO2 (%)   Tympanic Monitor Sitting Right arm --      Pain Score       9        Wt Readings from Last 1 Encounters:   07/10/18 95 2 kg (209 lb 15 8 oz)       Vital Signs (abnormal): T Max 100 1    Abnormal Labs/Diagnostic Test Results:  Na 132 - K 3 7 - Bun/cr 17/1 52 - T Protein 8 9 - Wbc 11 63   Urine - Blood- large -- Nitrite -positive - leuk - small - Protein 100- bacteria - innumerable   Urine culture - GNR  Resembling e coli     CXR - no acute  CT A & P - 5 mm calculus left UPJ with hydronephrosis & perinephric stranding   No perinephric collection  5 mm and less left renal calyceal calculi  5 mm calculus right renal pelvis   No hydronephrosis  2 mm calculus upper pole right kidney  Stranding adjacent to right renal pelvis and proximal left ureter   There is thickening of the bladder wall which may simply reflect under distention   Correlate with urinalysis to exclude nonspecific cystitis and ascending pyeloureteritis        ED Treatment:   Medication Administration from 07/10/2018 0930 to 07/10/2018 1832       Date/Time Order Dose Route Action     07/10/2018 1130 ondansetron (ZOFRAN) injection 4 mg 4 mg Intravenous Given     07/10/2018 1130 ketorolac (TORADOL) injection 30 mg 30 mg Intravenous Given     07/10/2018 1428 morphine (PF) 4 mg/mL injection 4 mg 4 mg Intravenous Given     07/10/2018 1706 ceftriaxone (ROCEPHIN) 1 g/50 mL in dextrose IVPB 1,000 mg Intravenous New Bag     07/10/2018 1704 lactated ringers infusion 100 mL/hr Intravenous New Bag       Past Medical/Surgical History:   Diagnosis    Kidney stones       Admitting Diagnosis: Hydronephrosis [N13 30]  UTI (urinary tract infection) [N39 0]  Flank pain [R10 9]  Left nephrolithiasis [N20 0]  Left ureteral stone [N20 1]    Age/Sex: 45 y o  female    Assessment/Plan: Plan cystoscopy, left ureteral stent placement as staged procedure tomorrow  NPO after midnight  I have explained to her the procedure, the risks of bleeding infection and damage to the urinary tract, and the fact that this is a staged procedure and she will need further ureteroscopy and laser lithotripsy after the infection is cleared  This for decompression only  She has signed informed consent      Admission Orders:  Scheduled Meds:   Current Facility-Administered Medications:  acetaminophen 650 mg Oral Q6H PRN    cefazolin 2,000 mg Intravenous Q8H    HYDROcodone-acetaminophen 1 tablet Oral Q6H PRN 7/10 x 1  7/11 x 1   HYDROmorphone 1 mg Intravenous Q3H PRN 7/11 x 1    ondansetron 4 mg Intravenous Q6H PRN      Continuous Infusions:   sodium chloride 100 mL/hr     Nursing orders -  Diet NPO - strain all urine -  activity as tolerated

## 2018-07-11 NOTE — OP NOTE
OPERATIVE REPORT  PATIENT NAME: Jani Garzon    :  1980  MRN: [id number]  Pt Location: [facility] ROOM 01    SURGERY DATE: 2018    Surgeon(s) and Role:     Iesha Serrato MD - Primary    Preop Diagnosis:  Hydronephrosis [N13 30]  Left nephrolithiasis [N20 0]  Left ureteral stone [N20 1]    Post-Op Diagnosis Codes: * Hydronephrosis [N13 30]     * Left nephrolithiasis [N20 0]     * Left ureteral stone [N20 1]    Procedure(s) (LRB):  CYSTOSCOPY WITH INSERTION LEFT STENT URETERAL (Left)    Specimen(s):  None     Estimated Blood Loss:   Minimal    Drains:  None       Anesthesia Type:   General/LMA    Operative Indications:  Hydronephrosis [N13 30]  Left nephrolithiasis [N20 0]  Left ureteral stone [N20 1]      Operative Findings:  Radiopaque 6 mm stone proximal left ureter  Purulent urine released upon placement of wire passed stone  Complications:   None    Procedure and Technique:  The patient is a 77-year-old admitted from the emergency department yesterday with UTI and a 6 mm proximal left ureteral calculus causing pain and hydronephrosis  Because of the UTI, I offered her stent placement alone for decompression with ureteroscopy laser lithotripsy to be done later date  She agrees the risks of bleeding infection and damage to the urinary tract were explained she gives informed consent  The patient was brought to the operating room and identified properly  LMA was induced the patient was prepped and draped in the dorsal lithotomy position usual fashion  A time-out was performed and cystoscopy was carried out with a 22 [place] cystoscopy sheath and 30 degree lens  The urethra was normal without stricture  The bladder showed patchy red spots consistent with cystitis  There are no stones tumors or other lesions in the bladder  The orifices were orthotopic and intact  Fluoroscopy demonstrated a proximal ureteral calculus    I placed a 0 035 inch guidewire into the ureter and up into the kidney past the stone  There was initially some resistance at the stone and I was able to pass it and a large rush of pus came out  I then placed a 6 Brazilian double-J stent over the wire and a coil was established in the renal pelvis and in the bladder  The bladder was drained and the patient was transferred to recovery room in good condition after extubation  No string was left on the stent  The patient will require cystoscopy left ureteroscopy laser lithotripsy in another week or 2 after her urinary tract infection has been treated  She can be discharged when afebrile and pain is under control with oral medications     I was present for the entire procedure and A qualified resident physician was not available    Patient Disposition:  PACU  and extubated and stable    SIGNATURE: Molly Douglas MD  DATE: July 11, 2018  TIME: 6:37 PM

## 2018-07-11 NOTE — ANESTHESIA PREPROCEDURE EVALUATION
Review of Systems/Medical History  Patient summary reviewed        Cardiovascular  Negative cardio ROS    Pulmonary  Smoker cigarette smoker  , Tobacco cessation counseling given ,        GI/Hepatic  Negative GI/hepatic ROS          Kidney stones,        Endo/Other    Obesity  morbid obesity   GYN  Negative gynecology ROS          Hematology  Negative hematology ROS      Musculoskeletal  Negative musculoskeletal ROS        Neurology  Negative neurology ROS      Psychology   Negative psychology ROS              Physical Exam    Airway    Mallampati score: II  TM Distance: >3 FB  Neck ROM: full     Dental       Cardiovascular  Comment: Negative ROS, Cardiovascular exam normal    Pulmonary  Pulmonary exam normal     Other Findings        Anesthesia Plan  ASA Score- 3     Anesthesia Type- general with ASA Monitors  Additional Monitors:   Airway Plan:         Plan Factors-    Induction- intravenous  Postoperative Plan-     Informed Consent- Anesthetic plan and risks discussed with patient  I personally reviewed this patient with the CRNA  Discussed and agreed on the Anesthesia Plan with the CRNA  Kennteh Oliva

## 2018-07-11 NOTE — ANESTHESIA POSTPROCEDURE EVALUATION
Post-Op Assessment Note      CV Status:  Stable    Mental Status:  Alert and awake    Hydration Status:  Euvolemic    PONV Controlled:  Controlled    Airway Patency:  Patent    Post Op Vitals Reviewed: Yes          Staff: AnesthesiologistGIRISH           /62 (07/11/18 1845)    Temp 97 8 °F (36 6 °C) (07/11/18 1845)    Pulse 84 (07/11/18 1845)   Resp 16 (07/11/18 1845)    SpO2 99 % (07/11/18 1845)

## 2018-07-11 NOTE — SOCIAL WORK
Met with pt and discussed role of CM  Pt rents a room in a 2-story home with 4 steps at entrance  Pt is independent in ADLs  Pt denies having any DME or prior HHC  No preference for pharmacy  No MH/D&A tx hx  No POA  Pt reports that she does not have any insurance and may need financial assistance with prescriptions upon d/c  Main contact: BrotherZoe Mackey ([phone])  Info-link card provided to pt  CM reviewed d/c planning process including the following: identifying help at home, patient preference for d/c planning needs, Discharge Lounge, [facility] Meds to Bed program, availability of treatment team to discuss questions or concerns patient and/or family may have regarding understanding medications and recognizing signs and symptoms once discharged  CM also encouraged patient to follow up with all recommended appointments after discharge  Patient advised of importance for patient and family to participate in managing patients medical well being

## 2018-07-11 NOTE — PROGRESS NOTES
Progress Note - June White 45 y o  female MRN: [id number]    Unit/Bed#: [facility] 625-01 Encounter: [id number]      Assessment:  Ms Mi Hitchcock is a 70-year-old female with prior history of nephrolithiasis and  instrumentation presenting to [facility] Emergency room with chief urologic complaint of left flank pain and dysuria; not accompanied by fever or chills  CT confirmed obstructing 5 mm left ureteral calculus with resultant hydronephrosis  T-max a 100 1° with current temperature 97 8°  Patient is receiving aggressive IV fluids and narcotics for pain control and reports improvement with symptoms  Empiric Ancef infusing and preliminary urine cultures show E coli growth  Plan:  Continue current management for renal colic  Strain urine  Maintain NP O  continue antibiotics  Await final cultures  Patient is scheduled for operative theater for cystoscopy, retrograde pyelography and left ureteral stent placement later today  Patient understands she will require staged ureteroscopic procedure for definitive stone treatment once sterilized  Subjective:   Reports improvement with left flank pain and dysuria  Denies fever, chills or gross hematuria  Objective:     Vitals: Blood pressure 117/63, pulse 67, temperature 97 8 °F (36 6 °C), temperature source Oral, resp  rate 16, height 5' 1" (1 549 m), weight 95 2 kg (209 lb 15 8 oz), last menstrual period 07/03/2018, SpO2 98 %  ,Body mass index is 39 68 kg/m²        Intake/Output Summary (Last 24 hours) at 07/11/18 1005  Last data filed at 07/11/18 0715   Gross per 24 hour   Intake          1268 33 ml   Output                0 ml   Net          1268 33 ml       Physical Exam: General appearance: alert and oriented, in no acute distress  Head: Normocephalic, without obvious abnormality, atraumatic  Neck: no adenopathy, no carotid bruit, no JVD, supple, symmetrical, trachea midline and thyroid not enlarged, symmetric, no tenderness/mass/nodules  Lungs: clear to auscultation bilaterally  Heart: regular rate and rhythm, S1, S2 normal, no murmur, click, rub or gallop  Abdomen: abnormal findings:  mild tenderness in the lower abdomen  Extremities: extremities normal, warm and well-perfused; no cyanosis, clubbing, or edema  Pulses: 2+ and symmetric  Neurologic: Grossly normal  No urinary drains     Invasive Devices     Peripheral Intravenous Line            Peripheral IV 07/10/18 Left Antecubital less than 1 day                Lab, Imaging and other studies: I have personally reviewed pertinent reports

## 2018-07-12 ENCOUNTER — TELEPHONE (OUTPATIENT)
Dept: UROLOGY | Facility: AMBULATORY SURGERY CENTER | Age: 38
End: 2018-07-12

## 2018-07-12 VITALS
HEIGHT: 61 IN | SYSTOLIC BLOOD PRESSURE: 130 MMHG | HEART RATE: 53 BPM | WEIGHT: 209.99 LBS | RESPIRATION RATE: 20 BRPM | TEMPERATURE: 97.8 F | BODY MASS INDEX: 39.65 KG/M2 | OXYGEN SATURATION: 96 % | DIASTOLIC BLOOD PRESSURE: 68 MMHG

## 2018-07-12 PROCEDURE — 99238 HOSP IP/OBS DSCHRG MGMT 30/<: CPT | Performed by: NURSE PRACTITIONER

## 2018-07-12 RX ORDER — SULFAMETHOXAZOLE AND TRIMETHOPRIM 800; 160 MG/1; MG/1
1 TABLET ORAL EVERY 12 HOURS SCHEDULED
Qty: 20 TABLET | Refills: 0 | Status: SHIPPED | OUTPATIENT
Start: 2018-07-12 | End: 2018-07-22

## 2018-07-12 RX ORDER — HYDROCODONE BITARTRATE AND ACETAMINOPHEN 5; 325 MG/1; MG/1
1 TABLET ORAL EVERY 6 HOURS PRN
Qty: 5 TABLET | Refills: 0 | Status: SHIPPED | OUTPATIENT
Start: 2018-07-12 | End: 2018-07-23 | Stop reason: ALTCHOICE

## 2018-07-12 RX ORDER — OXYBUTYNIN CHLORIDE 5 MG/1
5 TABLET ORAL 3 TIMES DAILY PRN
Qty: 30 TABLET | Refills: 3 | Status: ON HOLD | OUTPATIENT
Start: 2018-07-12 | End: 2018-12-09

## 2018-07-12 RX ADMIN — HYDROCODONE BITARTRATE AND ACETAMINOPHEN 1 TABLET: 5; 325 TABLET ORAL at 09:01

## 2018-07-12 RX ADMIN — SODIUM CHLORIDE 100 ML/HR: 0.9 INJECTION, SOLUTION INTRAVENOUS at 05:40

## 2018-07-12 RX ADMIN — CEFAZOLIN SODIUM 2000 MG: 2 SOLUTION INTRAVENOUS at 09:00

## 2018-07-12 RX ADMIN — OXYBUTYNIN CHLORIDE 5 MG: 5 TABLET ORAL at 09:02

## 2018-07-12 RX ADMIN — CEFAZOLIN SODIUM 2000 MG: 2 SOLUTION INTRAVENOUS at 01:34

## 2018-07-12 NOTE — TELEPHONE ENCOUNTER
Attempted to call main phone number on account and automated voice states "this caller does not accept incoming calls"  I called work phone which rang but I did not want to leave a message in case it really is PT's place of employment  I mailed certified letter to address on account

## 2018-07-12 NOTE — PROGRESS NOTES
UROLOGY PROGRESS NOTE   Patient Identifiers: Aster Gabriel (MRN [id number])  Date of Service: 7/12/2018    Subjective:     24 HR EVENTS:   no significant events  Patient has  no complaints  She reports mild left sided flank pain and occasional dysuria  Objective:     VITALS:    Vitals:    07/12/18 0743   BP: 130/68   Pulse: (!) 53   Resp: 20   Temp: 97 8 °F (36 6 °C)   SpO2: 96%       INS & OUTS:  I/O last 24 hours: In: 3242 3 [P O :120;  I V :3122 3]  Out: 1425 [Urine:1425]    LABS:  Lab Results   Component Value Date    HGB 14 5 07/10/2018    HCT 45 1 07/10/2018    WBC 11 63 (H) 07/10/2018     07/10/2018   ]    Lab Results   Component Value Date     (L) 07/10/2018    K 3 7 07/10/2018     07/10/2018    CO2 27 07/10/2018    BUN 17 07/10/2018    CREATININE 1 52 (H) 07/10/2018    CALCIUM 9 5 07/10/2018    GLUCOSE 101 07/10/2018   ]    INPATIENT MEDS:    Current Facility-Administered Medications:     acetaminophen (TYLENOL) tablet 650 mg, 650 mg, Oral, Q6H PRN, Eamon Power MD    ceFAZolin (ANCEF) IVPB (premix) 2,000 mg, 2,000 mg, Intravenous, Q8H, Eamon Power MD, Last Rate: 100 mL/hr at 07/12/18 0134, 2,000 mg at 07/12/18 0134    fentaNYL (SUBLIMAZE) injection 25 mcg, 25 mcg, Intravenous, Q3 min PRN, Vinita Morgan CRNA    HYDROcodone-acetaminophen (NORCO) 5-325 mg per tablet 1 tablet, 1 tablet, Oral, Q6H PRN, Eamon Power MD, 1 tablet at 07/11/18 1056    HYDROmorphone (DILAUDID) injection 0 2 mg, 0 2 mg, Intravenous, Q5 Min PRN, Vinita Morgan CRNA    HYDROmorphone (DILAUDID) injection 1 mg, 1 mg, Intravenous, Q3H PRN, Eamon Power MD, 1 mg at 07/11/18 6950    ondansetron (ZOFRAN) injection 4 mg, 4 mg, Intravenous, Q6H PRN, Eamon Power MD, 4 mg at 07/11/18 1639    ondansetron (ZOFRAN) injection 4 mg, 4 mg, Intravenous, Once PRN, Vinita Morgan CRNA    oxybutynin [facility]) tablet 5 mg, 5 mg, Oral, TID PRN, Eamon Power MD, 5 mg at 07/11/18 9568    sodium chloride 0 9 % infusion, 100 mL/hr, Intravenous, Continuous, [place], CRNP, Last Rate: 100 mL/hr at 07/12/18 0540, 100 mL/hr at 07/12/18 0540      Physical Exam:     GEN: alert and oriented x 3    RESP: breathing comfortably with no accessory muscle use    ABD: soft, non-tender, non-distended   EXT: no significant peripheral edema   : voiding without difficulty     Assessment:   46 y/o female POD 1 cystoscopy with insertion of left ureteral stent  Intraoperatively patient was found to have purulent urine upon placement of wire  She remains afebrile  Tolerating regular diet and ambulating on the bed  She reports mild left sided flank pain, dysuria, and had 1 episode of gross hematuria after procedure  Plan:   -Plan for discharge today on course of oral antibiotics  Prescriptions printed out  Patient expressed concern, as she has no insurance, per nursing, case management will discuss with patient  She will return to the office in 2 weeks to discuss elective ureteroscopy and laser lithotripsy after resolution of UTI  RN participated in rounds with AP  Plan of care discussed with patient and RN

## 2018-07-12 NOTE — PROGRESS NOTES
Patient has no c/o of nausea or discomfort after eating dinner  SCD pump sleeves off patient, pump turned off and removed from bed  IV pump turned off  SCD pump sleeves replaced, pump returned to appropriate location at foot of bed and powered on  IV pump powered on and re-programmed  IV flushed and site checked  Patient educated on importance of SCDs for blood clot prevention  She stated her understanding

## 2018-07-12 NOTE — TELEPHONE ENCOUNTER
Patient seen at [facility]  She is POD 1 ureteroscopy and stent by Dr Ajith Souza  Please arrange follow up appointment in 2 weeks to discuss elective staged ureteroscopy  OK with AP

## 2018-07-12 NOTE — DISCHARGE SUMMARY
Discharge Summary  Jeanell Hodgkins 45 y o  female MRN: [id number]  Unit/Bed#: [facility] 625-01 Encounter: [id number]    Admission Date: 7/10/18  Admission Orders     Ordered        07/10/18 1644  Inpatient Admission (expected length of stay for this patient is greater than two midnights)  Once         07/10/18 1648  Inpatient Admission  Once                Discharge Date: 7/12/18    Admitting Diagnosis: Hydronephrosis [N13 30]  UTI (urinary tract infection) [N39 0]  Flank pain [R10 9]  Left nephrolithiasis [N20 0]  Left ureteral stone [N20 1]    Discharge Diagnosis: UTI and nephrolithiasis     Resolved Problems  Date Reviewed: 7/12/2018    None          Attending: Dr Pepe Corrales      Procedures Performed: No orders of the defined types were placed in this encounter  Hospital Course: 2 days    Condition at Discharge: good     Discharge instructions/Information to patient and family:   See after visit summary for information provided to patient and family  Provisions for Follow-Up Care:  See after visit summary for information related to follow-up care and any pertinent home health orders  Disposition: See After Visit Summary for discharge disposition information  Planned Readmission: No    Discharge Statement   I spent 20 minutes discharging the patient  This time was spent on the day of discharge  I had direct contact with the patient on the day of discharge  Additional documentation is required if more than 30 minutes were spent on discharge  Discharge Medications:  See after visit summary for reconciled discharge medications provided to patient and family

## 2018-07-13 LAB
BACTERIA UR CULT: ABNORMAL
BACTERIA UR CULT: ABNORMAL

## 2018-07-18 ENCOUNTER — TELEPHONE (OUTPATIENT)
Dept: UROLOGY | Facility: AMBULATORY SURGERY CENTER | Age: 38
End: 2018-07-18

## 2018-07-20 ENCOUNTER — TELEPHONE (OUTPATIENT)
Dept: UROLOGY | Facility: MEDICAL CENTER | Age: 38
End: 2018-07-20

## 2018-07-20 NOTE — TELEPHONE ENCOUNTER
Patient's phone numbers were all incorrect in Epic  Patient's correct phone number was taken and triaged to [facility] to schedule the patient as soon as possible

## 2018-07-20 NOTE — TELEPHONE ENCOUNTER
Patient was in hospital and had stent put in  She wants to know if she needs to be scheduled to remove it anytime soon

## 2018-07-23 ENCOUNTER — OFFICE VISIT (OUTPATIENT)
Dept: UROLOGY | Facility: MEDICAL CENTER | Age: 38
End: 2018-07-23
Payer: COMMERCIAL

## 2018-07-23 VITALS
BODY MASS INDEX: 39.84 KG/M2 | SYSTOLIC BLOOD PRESSURE: 120 MMHG | HEIGHT: 61 IN | DIASTOLIC BLOOD PRESSURE: 66 MMHG | WEIGHT: 211 LBS

## 2018-07-23 DIAGNOSIS — N20.0 KIDNEY STONE: Primary | ICD-10-CM

## 2018-07-23 DIAGNOSIS — N20.1 URETERAL CALCULUS, LEFT: ICD-10-CM

## 2018-07-23 LAB
SL AMB  POCT GLUCOSE, UA: NEGATIVE
SL AMB LEUKOCYTE ESTERASE,UA: ABNORMAL
SL AMB POCT BILIRUBIN,UA: NEGATIVE
SL AMB POCT BLOOD,UA: ABNORMAL
SL AMB POCT CLARITY,UA: CLEAR
SL AMB POCT COLOR,UA: YELLOW
SL AMB POCT KETONES,UA: NEGATIVE
SL AMB POCT NITRITE,UA: NEGATIVE
SL AMB POCT PH,UA: 5.5
SL AMB POCT SPECIFIC GRAVITY,UA: 1.02
SL AMB POCT URINE PROTEIN: ABNORMAL
SL AMB POCT UROBILINOGEN: 0.2

## 2018-07-23 PROCEDURE — 81003 URINALYSIS AUTO W/O SCOPE: CPT | Performed by: UROLOGY

## 2018-07-23 PROCEDURE — 87086 URINE CULTURE/COLONY COUNT: CPT | Performed by: UROLOGY

## 2018-07-23 PROCEDURE — 99214 OFFICE O/P EST MOD 30 MIN: CPT | Performed by: UROLOGY

## 2018-07-23 NOTE — H&P
[address] [facility]  [facility]  [address] [facility]  [place], [address] [place] [address]  [phone]  www  [facility]  org      NAME: Jose Mehta  AGE: 45 y o  SEX: female  : 1980   MRN: [id number]    DATE: 2018  TIME: 10:18 AM    Assessment and Plan:  Proximal 6 mm left ureteral calculus with stent-for cystoscopy, possible left retrograde pyelography, left ureteroscopy, laser lithotripsy and left ureteral stent exchange  Risks explained and she gives informed consent  Recheck urine culture  Chief Complaint     Chief Complaint   Patient presents with    Nephrolithiasis     F/U from hospital       History of Present Illness   Radiopaque 6 mm proximal left ureteral calculus with pyonephrosis-stented by me 2018  Urine culture showed greater than 100,000 colonies E coli and 100,000 colonies of Staph saprophyticus  She was treated for this  She is now here to schedule cystoscopy, left ureteroscopy and laser lithotripsy of the proximal left ureteral stone and we will recheck another culture  The risks of bleeding infection and damage to the urinary tract were explained she gives informed consent  The following portions of the patient's history were reviewed and updated as appropriate: allergies, current medications, past family history, past medical history, past social history, past surgical history and problem list     Review of Systems   Review of Systems   Constitutional: Negative  Respiratory: Negative  Cardiovascular: Negative  Active Problem List     Patient Active Problem List   Diagnosis    Hydronephrosis    Left nephrolithiasis    Left ureteral stone       Objective   /66   Ht 5' 1" (1 549 m)   Wt 95 7 kg (211 lb)   LMP 2018   BMI 39 87 kg/m²      Physical Exam   Constitutional: She is oriented to person, place, and time  She appears well-developed and well-nourished     HENT:   Head: Normocephalic and atraumatic  Eyes: EOM are normal    Neck: Normal range of motion  Cardiovascular: Normal rate and regular rhythm  Pulmonary/Chest: Effort normal and breath sounds normal    Abdominal: Soft  She exhibits distension  There is no tenderness  There is no rebound  Neurological: She is alert and oriented to person, place, and time  Skin: Skin is warm and dry  Psychiatric: She has a normal mood and affect   Her behavior is normal  Judgment and thought content normal            Current Medications     Current Outpatient Prescriptions:     ibuprofen (MOTRIN) 400 mg tablet, Take 400 mg by mouth every 6 (six) hours as needed for mild pain (takes 1-2 as needed), Disp: , Rfl:     oxybutynin (DITROPAN) 5 mg tablet, Take 1 tablet (5 mg total) by mouth 3 (three) times a day as needed (Bladder spasms), Disp: 30 tablet, Rfl: 3        Leann Ni MD

## 2018-07-24 LAB — BACTERIA UR CULT: NORMAL

## 2018-07-24 NOTE — CASE MANAGEMENT
Notification of Discharge  This is a Notification of Discharge from our facility [address]  Please be advised that this patient has been discharge from our facility  Below you will find the admission and discharge date and time including the patients disposition  PRESENTATION DATE: 7/10/2018 10:37 AM  IP ADMISSION DATE: 7/10/18 1644  DISCHARGE DATE: 7/12/2018  1:28 PM  DISPOSITION: [address] [place] [address] in the [facility] by [place] Utilization Review Department  Phone: [phone]; Fax [phone]  ATTENTION: The Network Utilization Review Department is now centralized for our 9 Facilities  Make a note that we have a new phone and fax numbers for our Department  Please call with any questions or concerns to [phone] and carefully follow the prompts so that you are directed to the right person  All voicemails are confidential  Fax any determinations, approvals, denials, and requests for initial or continue stay review clinical to [phone]  Due to HIGH CALL volume, it would be easier if you could please send faxed requests to expedite your requests and in part, help us provide discharge notifications faster      Faith Lee   [phone]

## 2018-07-25 NOTE — PRE-PROCEDURE INSTRUCTIONS
Pre-Surgery Instructions:   Medication Instructions    ibuprofen (MOTRIN) 400 mg tablet Instructed patient per Anesthesia Guidelines   oxybutynin (DITROPAN) 5 mg tablet Instructed patient per Anesthesia Guidelines  Call cell of friend(Effie) [phone],,,LB given/reviewed [facility] preop instructions and may  chlorhexadine soap   Pt to hold asa/NSAIDS/vitamins/herbal supplements one week before surgery or as per Dr Pepe Corrales

## 2018-07-31 ENCOUNTER — ANESTHESIA EVENT (OUTPATIENT)
Dept: PERIOP | Facility: HOSPITAL | Age: 38
End: 2018-07-31
Payer: COMMERCIAL

## 2018-08-01 ENCOUNTER — ANESTHESIA (OUTPATIENT)
Dept: PERIOP | Facility: HOSPITAL | Age: 38
End: 2018-08-01
Payer: COMMERCIAL

## 2018-08-01 ENCOUNTER — HOSPITAL ENCOUNTER (OUTPATIENT)
Facility: HOSPITAL | Age: 38
Setting detail: OUTPATIENT SURGERY
Discharge: HOME | End: 2018-08-01
Attending: UROLOGY | Admitting: UROLOGY
Payer: COMMERCIAL

## 2018-08-01 ENCOUNTER — HOSPITAL ENCOUNTER (OUTPATIENT)
Dept: RADIOLOGY | Facility: HOSPITAL | Age: 38
Setting detail: OUTPATIENT SURGERY
Discharge: HOME | End: 2018-08-01
Payer: COMMERCIAL

## 2018-08-01 VITALS
HEIGHT: 61 IN | OXYGEN SATURATION: 97 % | HEART RATE: 72 BPM | DIASTOLIC BLOOD PRESSURE: 62 MMHG | WEIGHT: 210 LBS | BODY MASS INDEX: 39.65 KG/M2 | SYSTOLIC BLOOD PRESSURE: 113 MMHG | RESPIRATION RATE: 18 BRPM | TEMPERATURE: 98.3 F

## 2018-08-01 DIAGNOSIS — N20.1 CALCULUS OF URETER: ICD-10-CM

## 2018-08-01 DIAGNOSIS — N20.1 URETERAL CALCULUS, LEFT: Primary | ICD-10-CM

## 2018-08-01 PROCEDURE — C1769 GUIDE WIRE: HCPCS | Performed by: UROLOGY

## 2018-08-01 PROCEDURE — 76000 FLUOROSCOPY <1 HR PHYS/QHP: CPT

## 2018-08-01 PROCEDURE — 52356 CYSTO/URETERO W/LITHOTRIPSY: CPT | Performed by: UROLOGY

## 2018-08-01 PROCEDURE — C2617 STENT, NON-COR, TEM W/O DEL: HCPCS | Performed by: UROLOGY

## 2018-08-01 DEVICE — INLAY OPTIMA URETERAL STENT W/O GUIDEWIRE
Type: IMPLANTABLE DEVICE | Site: URETER | Status: FUNCTIONAL
Brand: BARD® INLAY OPTIMA® URETERAL STENT

## 2018-08-01 RX ORDER — HYDROCODONE BITARTRATE AND ACETAMINOPHEN 5; 325 MG/1; MG/1
1 TABLET ORAL EVERY 4 HOURS PRN
Qty: 15 TABLET | Refills: 0 | Status: SHIPPED | OUTPATIENT
Start: 2018-08-01 | End: 2018-08-11

## 2018-08-01 RX ORDER — HYDROCODONE BITARTRATE AND ACETAMINOPHEN 5; 325 MG/1; MG/1
1 TABLET ORAL EVERY 6 HOURS PRN
Status: DISCONTINUED | OUTPATIENT
Start: 2018-08-01 | End: 2018-08-01 | Stop reason: HOSPADM

## 2018-08-01 RX ORDER — GLYCOPYRROLATE 0.2 MG/ML
INJECTION INTRAMUSCULAR; INTRAVENOUS AS NEEDED
Status: DISCONTINUED | OUTPATIENT
Start: 2018-08-01 | End: 2018-08-01 | Stop reason: SURG

## 2018-08-01 RX ORDER — ONDANSETRON 2 MG/ML
4 INJECTION INTRAMUSCULAR; INTRAVENOUS ONCE AS NEEDED
Status: DISCONTINUED | OUTPATIENT
Start: 2018-08-01 | End: 2018-08-01 | Stop reason: HOSPADM

## 2018-08-01 RX ORDER — ONDANSETRON 2 MG/ML
INJECTION INTRAMUSCULAR; INTRAVENOUS AS NEEDED
Status: DISCONTINUED | OUTPATIENT
Start: 2018-08-01 | End: 2018-08-01 | Stop reason: SURG

## 2018-08-01 RX ORDER — MAGNESIUM HYDROXIDE 1200 MG/15ML
LIQUID ORAL AS NEEDED
Status: DISCONTINUED | OUTPATIENT
Start: 2018-08-01 | End: 2018-08-01 | Stop reason: HOSPADM

## 2018-08-01 RX ORDER — FENTANYL CITRATE/PF 50 MCG/ML
25 SYRINGE (ML) INJECTION
Status: DISCONTINUED | OUTPATIENT
Start: 2018-08-01 | End: 2018-08-01 | Stop reason: HOSPADM

## 2018-08-01 RX ORDER — SODIUM CHLORIDE 9 MG/ML
125 INJECTION, SOLUTION INTRAVENOUS CONTINUOUS
Status: DISCONTINUED | OUTPATIENT
Start: 2018-08-01 | End: 2018-08-01 | Stop reason: HOSPADM

## 2018-08-01 RX ORDER — LEVOFLOXACIN 500 MG/1
500 TABLET, FILM COATED ORAL EVERY 24 HOURS
Qty: 3 TABLET | Refills: 0 | Status: SHIPPED | OUTPATIENT
Start: 2018-08-01 | End: 2018-08-04

## 2018-08-01 RX ORDER — PROPOFOL 10 MG/ML
INJECTION, EMULSION INTRAVENOUS AS NEEDED
Status: DISCONTINUED | OUTPATIENT
Start: 2018-08-01 | End: 2018-08-01 | Stop reason: SURG

## 2018-08-01 RX ORDER — LIDOCAINE HYDROCHLORIDE 10 MG/ML
INJECTION, SOLUTION INFILTRATION; PERINEURAL AS NEEDED
Status: DISCONTINUED | OUTPATIENT
Start: 2018-08-01 | End: 2018-08-01 | Stop reason: SURG

## 2018-08-01 RX ADMIN — DEXAMETHASONE SODIUM PHOSPHATE 4 MG: 10 INJECTION INTRAMUSCULAR; INTRAVENOUS at 08:37

## 2018-08-01 RX ADMIN — SODIUM CHLORIDE: 0.9 INJECTION, SOLUTION INTRAVENOUS at 09:00

## 2018-08-01 RX ADMIN — HYDROCODONE BITARTRATE AND ACETAMINOPHEN 1 TABLET: 5; 325 TABLET ORAL at 10:00

## 2018-08-01 RX ADMIN — PROPOFOL 200 MG: 10 INJECTION, EMULSION INTRAVENOUS at 08:30

## 2018-08-01 RX ADMIN — SODIUM CHLORIDE 125 ML/HR: 0.9 INJECTION, SOLUTION INTRAVENOUS at 07:59

## 2018-08-01 RX ADMIN — GLYCOPYRROLATE 0.1 MG: 0.2 INJECTION, SOLUTION INTRAMUSCULAR; INTRAVENOUS at 08:37

## 2018-08-01 RX ADMIN — LIDOCAINE HYDROCHLORIDE 60 MG: 10 INJECTION, SOLUTION INFILTRATION; PERINEURAL at 08:30

## 2018-08-01 RX ADMIN — CEFAZOLIN SODIUM 3000 MG: 2 SOLUTION INTRAVENOUS at 08:35

## 2018-08-01 RX ADMIN — ONDANSETRON HYDROCHLORIDE 4 MG: 2 INJECTION, SOLUTION INTRAVENOUS at 08:37

## 2018-08-01 NOTE — H&P (VIEW-ONLY)
[address] [facility]  [facility]  [address] [facility], [address] [place] [address]  [phone]  www  [facility]  org      NAME: Aster Gabriel  AGE: 45 y o  SEX: female  : 1980   MRN: [id number]    DATE: 2018  TIME: 10:18 AM    Assessment and Plan:  Proximal 6 mm left ureteral calculus with stent-for cystoscopy, possible left retrograde pyelography, left ureteroscopy, laser lithotripsy and left ureteral stent exchange  Risks explained and she gives informed consent  Recheck urine culture  Chief Complaint     Chief Complaint   Patient presents with    Nephrolithiasis     F/U from hospital       History of Present Illness   Radiopaque 6 mm proximal left ureteral calculus with pyonephrosis-stented by me 2018  Urine culture showed greater than 100,000 colonies E coli and 100,000 colonies of Staph saprophyticus  She was treated for this  She is now here to schedule cystoscopy, left ureteroscopy and laser lithotripsy of the proximal left ureteral stone and we will recheck another culture  The risks of bleeding infection and damage to the urinary tract were explained she gives informed consent  The following portions of the patient's history were reviewed and updated as appropriate: allergies, current medications, past family history, past medical history, past social history, past surgical history and problem list     Review of Systems   Review of Systems   Constitutional: Negative  Respiratory: Negative  Cardiovascular: Negative  Active Problem List     Patient Active Problem List   Diagnosis    Hydronephrosis    Left nephrolithiasis    Left ureteral stone       Objective   /66   Ht 5' 1" (1 549 m)   Wt 95 7 kg (211 lb)   LMP 2018   BMI 39 87 kg/m²      Physical Exam   Constitutional: She is oriented to person, place, and time  She appears well-developed and well-nourished     HENT:   Head: Normocephalic and atraumatic  Eyes: EOM are normal    Neck: Normal range of motion  Cardiovascular: Normal rate and regular rhythm  Pulmonary/Chest: Effort normal and breath sounds normal    Abdominal: Soft  She exhibits distension  There is no tenderness  There is no rebound  Neurological: She is alert and oriented to person, place, and time  Skin: Skin is warm and dry  Psychiatric: She has a normal mood and affect   Her behavior is normal  Judgment and thought content normal            Current Medications     Current Outpatient Prescriptions:     ibuprofen (MOTRIN) 400 mg tablet, Take 400 mg by mouth every 6 (six) hours as needed for mild pain (takes 1-2 as needed), Disp: , Rfl:     oxybutynin (DITROPAN) 5 mg tablet, Take 1 tablet (5 mg total) by mouth 3 (three) times a day as needed (Bladder spasms), Disp: 30 tablet, Rfl: 3        Clemon Canavan, MD

## 2018-08-01 NOTE — DISCHARGE INSTRUCTIONS
Expect to see blood in the urine, and to experience urgency/frequency/burning with urination and dribbling  This is normal after urological procedures  Is also normal to experience some nausea after these procedures  Go back your regular diet carefully  It is normal to feel pain in the kidney when urinating and when the bladder is filling due to urine refluxing up to the kidney because of the open stent  The stent is necessary to keep the ureter open to allow clots and swelling to resolve and to allow the kidney to drain properly after instrumentation  Some stones may pass around the stent, but most stones pass after the stent is removed  The presence of the stent makes the ureter wider while it is in and after has been removed, allowing passage of larger fragments  Call for fever greater that 101 5, inability to urinate, prolonged nausea and vomiting, or severe pain not relieved by pain medications  [place] Lorenzana Keep stent string taped- if it becomes dislodged or gets pulled out early, that is OK- simply remove it completely by pulling on string until it is completely out  No driving/operating machinery for 24 hours, and while taking narcotics  Take over the counter remedy of choice to avoid constipation  Drink plenty of fluids

## 2018-08-01 NOTE — OP NOTE
OPERATIVE REPORT  PATIENT NAME: Nilton Doe    :  1980  MRN: [id number]  Pt Location: [facility] OR ROOM 03    SURGERY DATE: 2018    Surgeon(s) and Role:     * Leann Ni MD - Primary    Preop Diagnosis:  Ureteral calculus, left [N20 1]    Post-Op Diagnosis Codes:     * Ureteral calculus, left [N20 1]    Procedure(s) (LRB):  CYSTOSCOPY URETEROSCOPY WITH LITHOTRIPSY HOLMIUM LASER,  AND LEFT URETERAL STENT EXCHANGE (Left)    Specimen(s):  None     Estimated Blood Loss:   Minimal    Drains:  Ureteral Drain/Stent Left ureter 6 Fr  (Active)   Number of days: 0       Anesthesia Type:   General/LMA    Operative Indications:  Ureteral calculus, left [N20 1]      Operative Findings:  Two large calculi in the proximal, looks like calcium phosphate    Complications:   None    Procedure and Technique:  The patient is a 28-year-old woman who underwent decompressive stenting by me couple of weeks ago when she had a urinary tract infection and an 8 mm proximal left ureteral calculus  She is now here for definitive treatment the stone, the form cystoscopy, left ureteroscopy and laser lithotripsy  The risks of bleeding, infection and damage to the urinary tract explained she gives consent  Patient was brought to the operating room and identified properly  LMA was induced the patient was prepped and draped in the dorsal lithotomy position in the usual fashion  A time-out was performed  Cystoscopy was carried out with a 22 [place] cystoscopy sheath and 30 degree lens  The bladder showed no changes from the previous cystoscopy, and the stent was visualized in left ureteral orifice and grasped and brought out the urethral meatus  A wire was fed to the stent and the stone could be seen in the proximal ureter  Wire was fed up into the kidney under fluoroscopic guidance, and then I placed a second wire alongside it the cystoscope    One wire was clamped as a safety wire, and the flexible ureteral scope was fed over the other wire up to the level of the stone  The stone was visualized and may actually found that there were 2 stones 1 about 8 mm and the other 1 was about 10 mm  These were broken up into tiny passable fragments with the 272 holmium laser fiber  Panendoscopy of the kidney was carried out and there were no other stones seen  I then withdrew the scope through the ureter and saw no other abnormalities, and placed a 6 x 26 cm stent over the wire coils were established in renal pelvis and bladder  The string was left externalized and taped to the mons pubis     I was present for the entire procedure and A qualified resident physician was not available    Patient Disposition:  PACU  and extubated and stable    SIGNATURE: Audie Jameson MD  DATE: August 1, 2018  TIME: 9:03 AM

## 2018-08-01 NOTE — ANESTHESIA PREPROCEDURE EVALUATION
Review of Systems/Medical History  Patient summary reviewed  Chart reviewed  No history of anesthetic complications     Cardiovascular  Negative cardio ROS    Pulmonary  Negative pulmonary ROS        GI/Hepatic  Negative GI/hepatic ROS          Kidney stones,        Endo/Other    Obesity    GYN      Comment: S/p tubal ligation      Hematology  Negative hematology ROS      Musculoskeletal  Negative musculoskeletal ROS        Neurology  Negative neurology ROS      Psychology   Anxiety,              Physical Exam    Airway    Mallampati score: II  TM Distance: >3 FB  Neck ROM: full     Dental   No notable dental hx     Cardiovascular  Comment: Negative ROS, Rhythm: regular, Rate: normal,     Pulmonary  Breath sounds clear to auscultation,     Other Findings        Anesthesia Plan  ASA Score- 2     Anesthesia Type- general with ASA Monitors  Additional Monitors:   Airway Plan: LMA  Plan Factors-  Patient did not smoke on day of surgery  Induction- intravenous  Postoperative Plan- Plan for postoperative opioid use  Informed Consent- Anesthetic plan and risks discussed with patient

## 2018-08-01 NOTE — PROGRESS NOTES
Pt  was able to void  Pt states she had a steady stream of urine  Pt  States she feels like she was able to empty  Dr Nam Bradley aware   Pt  Stephanie Bennett for discharge

## 2018-08-07 ENCOUNTER — CLINICAL SUPPORT (OUTPATIENT)
Dept: UROLOGY | Facility: MEDICAL CENTER | Age: 38
End: 2018-08-07

## 2018-08-07 VITALS — TEMPERATURE: 98.9 F | BODY MASS INDEX: 39.65 KG/M2 | HEIGHT: 61 IN | WEIGHT: 210 LBS | RESPIRATION RATE: 18 BRPM

## 2018-08-07 DIAGNOSIS — N20.0 CALCULUS OF KIDNEY: Primary | ICD-10-CM

## 2018-08-07 PROCEDURE — 99024 POSTOP FOLLOW-UP VISIT: CPT

## 2018-08-07 NOTE — PROGRESS NOTES
Patient doing well,stent was removed without problems  Told to increase fluids   No Problems to call if any  Return in 6 mos

## 2018-12-08 ENCOUNTER — ANESTHESIA EVENT (INPATIENT)
Dept: PERIOP | Facility: HOSPITAL | Age: 38
DRG: 694 | End: 2018-12-08

## 2018-12-08 ENCOUNTER — APPOINTMENT (INPATIENT)
Dept: RADIOLOGY | Facility: HOSPITAL | Age: 38
DRG: 694 | End: 2018-12-08

## 2018-12-08 ENCOUNTER — ANESTHESIA (INPATIENT)
Dept: PERIOP | Facility: HOSPITAL | Age: 38
DRG: 694 | End: 2018-12-08

## 2018-12-08 ENCOUNTER — HOSPITAL ENCOUNTER (INPATIENT)
Facility: HOSPITAL | Age: 38
LOS: 1 days | Discharge: HOME | DRG: 694 | End: 2018-12-09
Attending: EMERGENCY MEDICINE | Admitting: UROLOGY

## 2018-12-08 ENCOUNTER — APPOINTMENT (EMERGENCY)
Dept: RADIOLOGY | Facility: HOSPITAL | Age: 38
DRG: 694 | End: 2018-12-08

## 2018-12-08 DIAGNOSIS — N20.1 LEFT URETERAL STONE: ICD-10-CM

## 2018-12-08 DIAGNOSIS — N39.0 UTI (URINARY TRACT INFECTION): ICD-10-CM

## 2018-12-08 DIAGNOSIS — N20.1 URETERAL CALCULUS, LEFT: ICD-10-CM

## 2018-12-08 DIAGNOSIS — N20.0 RIGHT NEPHROLITHIASIS: Primary | ICD-10-CM

## 2018-12-08 DIAGNOSIS — N20.0 LEFT NEPHROLITHIASIS: ICD-10-CM

## 2018-12-08 DIAGNOSIS — N13.30 HYDRONEPHROSIS: ICD-10-CM

## 2018-12-08 LAB
ANION GAP SERPL CALCULATED.3IONS-SCNC: 6 MMOL/L (ref 4–13)
BACTERIA UR QL AUTO: ABNORMAL /HPF
BASOPHILS # BLD AUTO: 0.03 THOUSANDS/ΜL (ref 0–0.1)
BASOPHILS NFR BLD AUTO: 0 % (ref 0–1)
BILIRUB UR QL STRIP: NEGATIVE
BUN SERPL-MCNC: 15 MG/DL (ref 5–25)
CALCIUM SERPL-MCNC: 8.5 MG/DL (ref 8.3–10.1)
CHLORIDE SERPL-SCNC: 107 MMOL/L (ref 100–108)
CLARITY UR: ABNORMAL
CO2 SERPL-SCNC: 28 MMOL/L (ref 21–32)
COLOR UR: YELLOW
COLOR, POC: YELLOW
CREAT SERPL-MCNC: 1.26 MG/DL (ref 0.6–1.3)
EOSINOPHIL # BLD AUTO: 0 THOUSAND/ΜL (ref 0–0.61)
EOSINOPHIL NFR BLD AUTO: 0 % (ref 0–6)
ERYTHROCYTE [DISTWIDTH] IN BLOOD BY AUTOMATED COUNT: 13 % (ref 11.6–15.1)
EXT PREG TEST URINE: NEGATIVE
GFR SERPL CREATININE-BSD FRML MDRD: 54 ML/MIN/1.73SQ M
GLUCOSE SERPL-MCNC: 116 MG/DL (ref 65–140)
GLUCOSE UR STRIP-MCNC: NEGATIVE MG/DL
HCT VFR BLD AUTO: 43.5 % (ref 34.8–46.1)
HGB BLD-MCNC: 13.9 G/DL (ref 11.5–15.4)
HGB UR QL STRIP.AUTO: ABNORMAL
HYALINE CASTS #/AREA URNS LPF: ABNORMAL /LPF
IMM GRANULOCYTES # BLD AUTO: 0.03 THOUSAND/UL (ref 0–0.2)
IMM GRANULOCYTES NFR BLD AUTO: 0 % (ref 0–2)
KETONES UR STRIP-MCNC: NEGATIVE MG/DL
LEUKOCYTE ESTERASE UR QL STRIP: ABNORMAL
LYMPHOCYTES # BLD AUTO: 1.03 THOUSANDS/ΜL (ref 0.6–4.47)
LYMPHOCYTES NFR BLD AUTO: 9 % (ref 14–44)
MCH RBC QN AUTO: 29.6 PG (ref 26.8–34.3)
MCHC RBC AUTO-ENTMCNC: 32 G/DL (ref 31.4–37.4)
MCV RBC AUTO: 93 FL (ref 82–98)
MONOCYTES # BLD AUTO: 0.28 THOUSAND/ΜL (ref 0.17–1.22)
MONOCYTES NFR BLD AUTO: 3 % (ref 4–12)
NEUTROPHILS # BLD AUTO: 9.77 THOUSANDS/ΜL (ref 1.85–7.62)
NEUTS SEG NFR BLD AUTO: 88 % (ref 43–75)
NITRITE UR QL STRIP: POSITIVE
NON-SQ EPI CELLS URNS QL MICRO: ABNORMAL /HPF
NRBC BLD AUTO-RTO: 0 /100 WBCS
PH UR STRIP.AUTO: 6 [PH] (ref 4.5–8)
PLATELET # BLD AUTO: 251 THOUSANDS/UL (ref 149–390)
PMV BLD AUTO: 10.5 FL (ref 8.9–12.7)
POTASSIUM SERPL-SCNC: 3.5 MMOL/L (ref 3.5–5.3)
PROT UR STRIP-MCNC: ABNORMAL MG/DL
RBC # BLD AUTO: 4.69 MILLION/UL (ref 3.81–5.12)
RBC #/AREA URNS AUTO: ABNORMAL /HPF
SODIUM SERPL-SCNC: 141 MMOL/L (ref 136–145)
SP GR UR STRIP.AUTO: >=1.03 (ref 1–1.03)
UROBILINOGEN UR QL STRIP.AUTO: 0.2 E.U./DL
WBC # BLD AUTO: 11.14 THOUSAND/UL (ref 4.31–10.16)
WBC #/AREA URNS AUTO: ABNORMAL /HPF

## 2018-12-08 PROCEDURE — BT1DZZZ FLUOROSCOPY OF RIGHT KIDNEY, URETER AND BLADDER: ICD-10-PCS | Performed by: UROLOGY

## 2018-12-08 PROCEDURE — 74176 CT ABD & PELVIS W/O CONTRAST: CPT

## 2018-12-08 PROCEDURE — 81025 URINE PREGNANCY TEST: CPT | Performed by: EMERGENCY MEDICINE

## 2018-12-08 PROCEDURE — 87077 CULTURE AEROBIC IDENTIFY: CPT

## 2018-12-08 PROCEDURE — 0T9680Z DRAINAGE OF RIGHT URETER WITH DRAINAGE DEVICE, VIA NATURAL OR ARTIFICIAL OPENING ENDOSCOPIC: ICD-10-PCS | Performed by: UROLOGY

## 2018-12-08 PROCEDURE — 96376 TX/PRO/DX INJ SAME DRUG ADON: CPT

## 2018-12-08 PROCEDURE — 80048 BASIC METABOLIC PNL TOTAL CA: CPT | Performed by: EMERGENCY MEDICINE

## 2018-12-08 PROCEDURE — 85025 COMPLETE CBC W/AUTO DIFF WBC: CPT | Performed by: EMERGENCY MEDICINE

## 2018-12-08 PROCEDURE — 87086 URINE CULTURE/COLONY COUNT: CPT

## 2018-12-08 PROCEDURE — 36415 COLL VENOUS BLD VENIPUNCTURE: CPT | Performed by: EMERGENCY MEDICINE

## 2018-12-08 PROCEDURE — 52332 CYSTOSCOPY AND TREATMENT: CPT | Performed by: UROLOGY

## 2018-12-08 PROCEDURE — 87147 CULTURE TYPE IMMUNOLOGIC: CPT

## 2018-12-08 PROCEDURE — 74420 UROGRAPHY RTRGR +-KUB: CPT

## 2018-12-08 PROCEDURE — 96375 TX/PRO/DX INJ NEW DRUG ADDON: CPT

## 2018-12-08 PROCEDURE — 96374 THER/PROPH/DIAG INJ IV PUSH: CPT

## 2018-12-08 PROCEDURE — 81001 URINALYSIS AUTO W/SCOPE: CPT

## 2018-12-08 PROCEDURE — C2617 STENT, NON-COR, TEM W/O DEL: HCPCS | Performed by: UROLOGY

## 2018-12-08 PROCEDURE — 99285 EMERGENCY DEPT VISIT HI MDM: CPT

## 2018-12-08 PROCEDURE — 96361 HYDRATE IV INFUSION ADD-ON: CPT

## 2018-12-08 PROCEDURE — C1769 GUIDE WIRE: HCPCS | Performed by: UROLOGY

## 2018-12-08 DEVICE — STENT URETERAL 6 FR 26CM INLAY OPTIMA
Type: IMPLANTABLE DEVICE | Site: URETER | Status: NON-FUNCTIONAL
Removed: 2018-12-18

## 2018-12-08 RX ORDER — OXYCODONE HYDROCHLORIDE AND ACETAMINOPHEN 5; 325 MG/1; MG/1
2 TABLET ORAL EVERY 4 HOURS PRN
Status: DISCONTINUED | OUTPATIENT
Start: 2018-12-08 | End: 2018-12-09 | Stop reason: HOSPADM

## 2018-12-08 RX ORDER — MEPERIDINE HYDROCHLORIDE 25 MG/ML
25 INJECTION INTRAMUSCULAR; INTRAVENOUS; SUBCUTANEOUS AS NEEDED
Status: DISCONTINUED | OUTPATIENT
Start: 2018-12-08 | End: 2018-12-08 | Stop reason: HOSPADM

## 2018-12-08 RX ORDER — FENTANYL CITRATE 50 UG/ML
50 INJECTION, SOLUTION INTRAMUSCULAR; INTRAVENOUS ONCE
Status: COMPLETED | OUTPATIENT
Start: 2018-12-08 | End: 2018-12-08

## 2018-12-08 RX ORDER — ONDANSETRON 2 MG/ML
INJECTION INTRAMUSCULAR; INTRAVENOUS AS NEEDED
Status: DISCONTINUED | OUTPATIENT
Start: 2018-12-08 | End: 2018-12-08 | Stop reason: SURG

## 2018-12-08 RX ORDER — PROPOFOL 10 MG/ML
INJECTION, EMULSION INTRAVENOUS AS NEEDED
Status: DISCONTINUED | OUTPATIENT
Start: 2018-12-08 | End: 2018-12-08 | Stop reason: SURG

## 2018-12-08 RX ORDER — SODIUM CHLORIDE, SODIUM LACTATE, POTASSIUM CHLORIDE, CALCIUM CHLORIDE 600; 310; 30; 20 MG/100ML; MG/100ML; MG/100ML; MG/100ML
125 INJECTION, SOLUTION INTRAVENOUS CONTINUOUS
Status: DISCONTINUED | OUTPATIENT
Start: 2018-12-08 | End: 2018-12-09 | Stop reason: HOSPADM

## 2018-12-08 RX ORDER — MORPHINE SULFATE 4 MG/ML
4 INJECTION, SOLUTION INTRAMUSCULAR; INTRAVENOUS EVERY 2 HOUR PRN
Status: DISCONTINUED | OUTPATIENT
Start: 2018-12-08 | End: 2018-12-09 | Stop reason: HOSPADM

## 2018-12-08 RX ORDER — OXYCODONE HYDROCHLORIDE AND ACETAMINOPHEN 5; 325 MG/1; MG/1
1 TABLET ORAL EVERY 4 HOURS PRN
Status: DISCONTINUED | OUTPATIENT
Start: 2018-12-08 | End: 2018-12-08 | Stop reason: SDUPTHER

## 2018-12-08 RX ORDER — MAGNESIUM HYDROXIDE 1200 MG/15ML
LIQUID ORAL AS NEEDED
Status: DISCONTINUED | OUTPATIENT
Start: 2018-12-08 | End: 2018-12-08 | Stop reason: HOSPADM

## 2018-12-08 RX ORDER — METOCLOPRAMIDE HYDROCHLORIDE 5 MG/ML
10 INJECTION INTRAMUSCULAR; INTRAVENOUS ONCE AS NEEDED
Status: DISCONTINUED | OUTPATIENT
Start: 2018-12-08 | End: 2018-12-08 | Stop reason: HOSPADM

## 2018-12-08 RX ORDER — OXYBUTYNIN CHLORIDE 5 MG/1
5 TABLET ORAL 2 TIMES DAILY
Status: DISCONTINUED | OUTPATIENT
Start: 2018-12-08 | End: 2018-12-09 | Stop reason: HOSPADM

## 2018-12-08 RX ORDER — ONDANSETRON 2 MG/ML
INJECTION INTRAMUSCULAR; INTRAVENOUS
Status: COMPLETED
Start: 2018-12-08 | End: 2018-12-08

## 2018-12-08 RX ORDER — MORPHINE SULFATE 4 MG/ML
4 INJECTION, SOLUTION INTRAMUSCULAR; INTRAVENOUS EVERY 2 HOUR PRN
Status: DISCONTINUED | OUTPATIENT
Start: 2018-12-08 | End: 2018-12-08

## 2018-12-08 RX ORDER — ONDANSETRON 2 MG/ML
4 INJECTION INTRAMUSCULAR; INTRAVENOUS ONCE
Status: COMPLETED | OUTPATIENT
Start: 2018-12-08 | End: 2018-12-08

## 2018-12-08 RX ORDER — MIDAZOLAM HYDROCHLORIDE 1 MG/ML
INJECTION INTRAMUSCULAR; INTRAVENOUS AS NEEDED
Status: DISCONTINUED | OUTPATIENT
Start: 2018-12-08 | End: 2018-12-08 | Stop reason: SURG

## 2018-12-08 RX ORDER — PHENAZOPYRIDINE HYDROCHLORIDE 100 MG/1
200 TABLET, FILM COATED ORAL 3 TIMES DAILY PRN
Status: DISCONTINUED | OUTPATIENT
Start: 2018-12-08 | End: 2018-12-09 | Stop reason: HOSPADM

## 2018-12-08 RX ORDER — METOCLOPRAMIDE HYDROCHLORIDE 5 MG/ML
10 INJECTION INTRAMUSCULAR; INTRAVENOUS ONCE
Status: COMPLETED | OUTPATIENT
Start: 2018-12-08 | End: 2018-12-08

## 2018-12-08 RX ORDER — ONDANSETRON 2 MG/ML
4 INJECTION INTRAMUSCULAR; INTRAVENOUS EVERY 6 HOURS PRN
Status: DISCONTINUED | OUTPATIENT
Start: 2018-12-08 | End: 2018-12-09 | Stop reason: HOSPADM

## 2018-12-08 RX ORDER — FENTANYL CITRATE/PF 50 MCG/ML
25 SYRINGE (ML) INJECTION
Status: DISCONTINUED | OUTPATIENT
Start: 2018-12-08 | End: 2018-12-08 | Stop reason: HOSPADM

## 2018-12-08 RX ORDER — OXYCODONE HYDROCHLORIDE AND ACETAMINOPHEN 5; 325 MG/1; MG/1
1 TABLET ORAL EVERY 4 HOURS PRN
Status: DISCONTINUED | OUTPATIENT
Start: 2018-12-08 | End: 2018-12-09 | Stop reason: HOSPADM

## 2018-12-08 RX ADMIN — OXYCODONE HYDROCHLORIDE AND ACETAMINOPHEN 1 TABLET: 5; 325 TABLET ORAL at 19:50

## 2018-12-08 RX ADMIN — ONDANSETRON 4 MG: 2 INJECTION INTRAMUSCULAR; INTRAVENOUS at 03:53

## 2018-12-08 RX ADMIN — LIDOCAINE HYDROCHLORIDE 150 MG: 10 INJECTION, SOLUTION INFILTRATION; PERINEURAL at 04:49

## 2018-12-08 RX ADMIN — DEXAMETHASONE SODIUM PHOSPHATE 8 MG: 10 INJECTION INTRAMUSCULAR; INTRAVENOUS at 14:25

## 2018-12-08 RX ADMIN — ONDANSETRON 4 MG: 2 INJECTION INTRAMUSCULAR; INTRAVENOUS at 08:42

## 2018-12-08 RX ADMIN — SODIUM CHLORIDE, SODIUM LACTATE, POTASSIUM CHLORIDE, AND CALCIUM CHLORIDE: .6; .31; .03; .02 INJECTION, SOLUTION INTRAVENOUS at 14:19

## 2018-12-08 RX ADMIN — CEFTRIAXONE SODIUM 1000 MG: 10 INJECTION, POWDER, FOR SOLUTION INTRAVENOUS at 06:38

## 2018-12-08 RX ADMIN — SODIUM CHLORIDE 1000 ML: 0.9 INJECTION, SOLUTION INTRAVENOUS at 04:30

## 2018-12-08 RX ADMIN — SODIUM CHLORIDE, SODIUM LACTATE, POTASSIUM CHLORIDE, AND CALCIUM CHLORIDE 125 ML/HR: .6; .31; .03; .02 INJECTION, SOLUTION INTRAVENOUS at 08:36

## 2018-12-08 RX ADMIN — MORPHINE SULFATE 4 MG: 4 INJECTION INTRAVENOUS at 08:36

## 2018-12-08 RX ADMIN — LIDOCAINE HYDROCHLORIDE 50 ML: 20 INJECTION, SOLUTION INTRAVENOUS at 14:21

## 2018-12-08 RX ADMIN — FENTANYL CITRATE 50 MCG: 50 INJECTION, SOLUTION INTRAMUSCULAR; INTRAVENOUS at 06:21

## 2018-12-08 RX ADMIN — PROPOFOL 200 MG: 10 INJECTION, EMULSION INTRAVENOUS at 14:21

## 2018-12-08 RX ADMIN — METOCLOPRAMIDE 10 MG: 5 INJECTION, SOLUTION INTRAMUSCULAR; INTRAVENOUS at 05:29

## 2018-12-08 RX ADMIN — MIDAZOLAM 2 MG: 1 INJECTION INTRAMUSCULAR; INTRAVENOUS at 14:19

## 2018-12-08 RX ADMIN — ONDANSETRON 4 MG: 2 INJECTION INTRAMUSCULAR; INTRAVENOUS at 14:26

## 2018-12-08 RX ADMIN — FENTANYL CITRATE 50 MCG: 50 INJECTION, SOLUTION INTRAMUSCULAR; INTRAVENOUS at 04:29

## 2018-12-08 NOTE — ANESTHESIA PREPROCEDURE EVALUATION
Review of Systems/Medical History  Patient summary reviewed  Chart reviewed  No history of anesthetic complications     Cardiovascular  Negative cardio ROS    Pulmonary  Smoker ,        GI/Hepatic       Kidney stones,        Endo/Other    Obesity    GYN  Not currently pregnant ,          Hematology  Negative hematology ROS      Musculoskeletal  Negative musculoskeletal ROS        Neurology  Negative neurology ROS      Psychology   Anxiety,              Physical Exam    Airway    Mallampati score: II  TM Distance: >3 FB  Neck ROM: full     Dental   No notable dental hx     Cardiovascular  Comment: Negative ROS,     Pulmonary      Other Findings        Anesthesia Plan  ASA Score- 2     Anesthesia Type- general with ASA Monitors  Additional Monitors:   Airway Plan: LMA  Plan Factors-  Patient did not smoke on day of surgery  Induction- intravenous  Postoperative Plan-     Informed Consent- Anesthetic plan and risks discussed with patient  I personally reviewed this patient with the CRNA  Discussed and agreed on the Anesthesia Plan with the CRNA  Fletcher Baum

## 2018-12-08 NOTE — ED ATTENDING ATTESTATION
Rajinder Mera DO, saw and evaluated the patient  I have discussed the patient with the resident/non-physician practitioner and agree with the resident's/non-physician practitioner's findings, Plan of Care, and MDM as documented in the resident's/non-physician practitioner's note, except where noted  All available labs and Radiology studies were reviewed  At this point I agree with the current assessment done in the Emergency Department  I have conducted an independent evaluation of this patient a history and physical is as follows:    44 yo female with hx of multiple prior kidney stones presents for acute onset R sided flank pain, 1 episode of NBNB emesis  States pain feels similar to prior stones  Pain rated as severe, constant in nature, no a/e factors  Started 1 hr PTA  Denies fever, dysuria, frequency, abd pain  Imp: R flank pain likely stone plan: analgesia, BMP, UA  Reassess        Critical Care Time  CritCare Time    Procedures

## 2018-12-08 NOTE — OP NOTE
OPERATIVE REPORT  PATIENT NAME: Catherine Rosas    :  1980  MRN: [id number]  Pt Location: [facility] 01    SURGERY DATE: 2018    Surgeon(s) and Role:     * Adam De Luna MD - Primary    Preop Diagnosis:  Right nephrolithiasis [N20 0]    Post-Op Diagnosis Codes:     * Right nephrolithiasis [N20 0]    Procedure(s) (LRB):  CYSTOSCOPY RETROGRADE PYELOGRAM WITH INSERTION STENT URETERAL (Right)    Specimen(s):  * No specimens in log *    Estimated Blood Loss:   Minimal    Drains:  Ureteral Drain/Stent Left ureter 6 Fr  (Active)   Number of days: 129       Anesthesia Type:   General/LMA    Operative Indications:  Right nephrolithiasis [N20 0]      Operative Findings: Moderate hydronephrosis from stone at UPJ    Complications:   None  The MRI Erlinda Woodlyn and the robot was through the Detrol irregular get old who for the to do their Detrol for the not really I think if to again  Procedure and Technique:      After the patient was placed under adequate anesthesia, she was prepped and draped using betadine solution in lithotomy position  The 25 [place] scope was passed thru the urethra, which showed no strictures  The bladder had mild chronic inflammation  Contrast was injected in the right orifice gently just to opacify the course of the ureter  I was sure not to over distend the right collecting system  There was mild hydronephrosis and contrast did not go past the UPJ, so I stopped injecting  A wire was placed in the ureter, it went past the stone into the renal pelvis collecting system  Over the wire I placed a six [employer] [place] contour stent  It went easily over the wire up to the renal collecting system  I removed the wire  The scope was removed, leaving the stent in place  She was taken to  in good condition       I was present for the entire procedure    Patient Disposition:  PACU     SIGNATURE: Adam De Luna MD  DATE: 2018  TIME: 2:44 PM

## 2018-12-08 NOTE — ANESTHESIA POSTPROCEDURE EVALUATION
Post-Op Assessment Note      CV Status:  Stable    Mental Status:  Alert and awake    Hydration Status:  Euvolemic    PONV Controlled:  Controlled    Airway Patency:  Patent    Post Op Vitals Reviewed: Yes          Staff: CRNA           BP   100/60   Temp   97 1   Pulse  66   Resp   12   SpO2   100%

## 2018-12-08 NOTE — H&P
HISTORY AND PHYSICAL  ? ? Patient Name: Marsh Gitelman  Patient MRN: [id number]  Attending Provider: Mandi Moore MD  Service: Urology  Chief Complaint    Right renal stone, probable infection       HPI   Marsh Gitelman is a 45 y o  female with  pain, nausea vomiting, starting  overnight  CT shows 12 mm stone right pelvis with mild hydro  Urine shows numerous white cells and red cells, bacteria  I plan  cysto, right ureteral stent  On ceftriaxone  Potential risks and complications discussed, and informed consent was given by the patient  Medications  Meds/Allergies     lactated ringers 125 mL/hr Last Rate: 125 mL/hr (12/08/18 0836)     morphine injection    ondansetron    oxyCODONE-acetaminophen  Prior to Admission Medications   Prescriptions Last Dose Informant Patient Reported?  Taking?   ibuprofen (MOTRIN) 400 mg tablet   Yes No   Sig: Take 400 mg by mouth every 6 (six) hours as needed for mild pain (takes 1-2 as needed)   oxybutynin (DITROPAN) 5 mg tablet   No No   Sig: Take 1 tablet (5 mg total) by mouth 3 (three) times a day as needed (Bladder spasms)      Facility-Administered Medications: None       Current Facility-Administered Medications:     lactated ringers infusion, 125 mL/hr, Intravenous, Continuous, Mandi Moore MD, Last Rate: 125 mL/hr at 12/08/18 0836, 125 mL/hr at 12/08/18 0836    morphine (PF) 4 mg/mL injection 4 mg, 4 mg, Intravenous, Q2H PRN, Mandi Moore MD, 4 mg at 12/08/18 0836    ondansetron (ZOFRAN) injection 4 mg, 4 mg, Intravenous, Q6H PRN, Mandi Moore MD, 4 mg at 12/08/18 0842    oxyCODONE-acetaminophen (PERCOCET) 5-325 mg per tablet 1 tablet, 1 tablet, Oral, Q4H PRN, Mandi Moore MD  Review of Systems  10 point review of systems negative except as noted in HPI  Allergies  No Known Allergies  PMH  Past Medical History:   Diagnosis Date    Anxiety     not currently on meds    Kidney stones     Obesity     Teeth missing      Past surgical history  Past Surgical History:   Procedure Laterality Date    NV CYSTO/URETERO W/LITHOTRIPSY &INDWELL STENT INSRT Left 8/1/2018    Procedure: CYSTOSCOPY URETEROSCOPY WITH LITHOTRIPSY HOLMIUM LASER,  AND LEFT URETERAL STENT EXCHANGE;  Surgeon: Lyn Escobar MD;  [facility];  Service: Urology    NV CYSTOURETHROSCOPY,URETER CATHETER Left 7/11/2018    Procedure: CYSTOSCOPY WITH INSERTION LEFT STENT URETERAL;  Surgeon: Lyn Escobar MD;  [facility];  Service: Urology    TUBAL LIGATION  2007     Social history  Social History   Substance Use Topics    Smoking status: Former Smoker     Packs/day: 1 00     Years: 23 00     Types: Cigarettes    Smokeless tobacco: Never Used      Comment: recently quit    Alcohol use No     ?  Physical Exam  General appearance: alert and oriented, in no acute distress  Head: Normocephalic, without obvious abnormality, atraumatic  Neck: no adenopathy, no carotid bruit, no JVD, supple, symmetrical, trachea midline and thyroid not enlarged, symmetric, no tenderness/mass/nodules  Back: symmetric, no curvature  ROM normal  No CVA tenderness    Lungs: clear to auscultation bilaterally  Heart: regular rate and rhythm, S1, S2 normal, no murmur, click, rub or gallop  Abdomen: soft, non-tender; bowel sounds normal; no masses,  no organomegaly  Extremities: extremities normal, warm and well-perfused; no cyanosis, clubbing, or edema  Pulses: 2+ and symmetric  Lymph nodes: Cervical, supraclavicular, and axillary nodes normal   Neurologic: Grossly normal  Tyra Cortez MD

## 2018-12-08 NOTE — QUICK NOTE
See op note, stent placed  Kidney was mildly hydronephrotic  Plan is to watch overnight, repeat CBC in the morning  If CBC shows better white count, temperature is down, likely discharge tomorrow after her dose of Rocephin and plan for outpatient procedure 1-2 weeks to laser the stone

## 2018-12-08 NOTE — ED PROVIDER NOTES
History  Chief Complaint   Patient presents with    Flank Pain     right sided flank pain, started about 1 hour ago, vomiting x 1, States feels like previous kidney stone  This is a 71-year-old female with a history of recurrent kidney stones requiring lithotripsy in past presenting to the emergency department with sudden onset of right flank pain that woke her from sleep in the middle the night this evening  She works that she woke up felt nauseous vomited multiple times  She then came straight to the emergency department  She describes the pain is a sharp, cramping pain in her right flank that is non radiating  She denies any aggravating or alleviating factors  She took Motrin at home but vomited it back up immediately  She denies any recent illnesses, fevers or chills diarrhea constipation, abdominal pain hematuria, burning on urination, vaginal bleeding, or vaginal discharge  Prior to Admission Medications   Prescriptions Last Dose Informant Patient Reported?  Taking?   ibuprofen (MOTRIN) 400 mg tablet   Yes No   Sig: Take 400 mg by mouth every 6 (six) hours as needed for mild pain (takes 1-2 as needed)   oxybutynin (DITROPAN) 5 mg tablet   No No   Sig: Take 1 tablet (5 mg total) by mouth 3 (three) times a day as needed (Bladder spasms)      Facility-Administered Medications: None       Past Medical History:   Diagnosis Date    Anxiety     not currently on meds    Kidney stones     Obesity     Teeth missing        Past Surgical History:   Procedure Laterality Date    MS CYSTO/URETERO W/LITHOTRIPSY &INDWELL STENT INSRT Left 8/1/2018    Procedure: CYSTOSCOPY URETEROSCOPY WITH LITHOTRIPSY HOLMIUM LASER,  AND LEFT URETERAL STENT EXCHANGE;  Surgeon: Severa Chol, MD;  [facility];  Service: Urology    MS CYSTOURETHROSCOPY,URETER CATHETER Left 7/11/2018    Procedure: CYSTOSCOPY WITH INSERTION LEFT STENT URETERAL;  Surgeon: Severa Chol, MD;  [facility];  Service: Urology   Dionna Peralta TUBAL LIGATION  2007       Family History   Problem Relation Age of Onset    Diabetes Mother     Hypertension Mother     Stroke Mother     Nephrolithiasis Mother     Diabetes Father     Hypertension Father      I have reviewed and agree with the history as documented  Social History   Substance Use Topics    Smoking status: Current Every Day Smoker     Packs/day: 1 00     Years: 23 00     Types: Cigarettes    Smokeless tobacco: Never Used    Alcohol use Yes      Comment: occasionally        Review of Systems   Constitutional: Negative for chills and fever  HENT: Negative for congestion and sore throat  Eyes: Negative for visual disturbance  Respiratory: Negative for cough and shortness of breath  Cardiovascular: Negative for chest pain and palpitations  Gastrointestinal: Positive for nausea and vomiting  Negative for abdominal pain and diarrhea  Genitourinary: Positive for flank pain  Negative for difficulty urinating, dysuria, frequency, hematuria, pelvic pain, vaginal bleeding, vaginal discharge and vaginal pain  Musculoskeletal: Negative for myalgias  Skin: Negative for rash  Neurological: Negative for weakness, light-headedness, numbness and headaches         Physical Exam  ED Triage Vitals   Temperature Pulse Respirations Blood Pressure SpO2   12/08/18 0333 12/08/18 0333 12/08/18 0333 12/08/18 0333 12/08/18 0333   (!) 96 7 °F (35 9 °C) 71 18 154/81 98 %      Temp Source Heart Rate Source Patient Position - Orthostatic VS BP Location FiO2 (%)   12/08/18 0836 12/08/18 0445 12/08/18 0836 12/08/18 0836 12/08/18 1440   Oral Monitor Lying Right arm 100      Pain Score       12/08/18 0333       Worst Possible Pain           Orthostatic Vital Signs  Vitals:    12/08/18 1500 12/08/18 1515 12/08/18 1530 12/08/18 1630   BP: 97/60 102/63 97/60 108/61   Pulse: (!) 52 88 60 70   Patient Position - Orthostatic VS:   Lying Lying       Physical Exam   Constitutional: She is oriented to person, place, and time  She appears well-developed and well-nourished  She appears distressed (Mild distress)  HENT:   Head: Normocephalic and atraumatic  Mouth/Throat: Oropharynx is clear and moist    Eyes: Pupils are equal, round, and reactive to light  EOM are normal    Neck: Normal range of motion  Neck supple  Cardiovascular: Normal rate, regular rhythm, normal heart sounds and intact distal pulses  Pulmonary/Chest: Effort normal and breath sounds normal  No respiratory distress  Abdominal: Soft  Bowel sounds are normal  She exhibits no distension  There is no tenderness  There is no rebound and no guarding  Musculoskeletal: Normal range of motion  She exhibits no edema or tenderness  No CVA tenderness   Neurological: She is alert and oriented to person, place, and time  No cranial nerve deficit  She exhibits normal muscle tone  Coordination normal    Skin: Skin is warm and dry  Capillary refill takes less than 2 seconds  She is not diaphoretic  Psychiatric: She has a normal mood and affect  Nursing note and vitals reviewed        ED Medications  Medications   lactated ringers infusion ( Intravenous Anesthesia Volume Adjustment 12/8/18 1443)   morphine (PF) 4 mg/mL injection 4 mg ( Intravenous MAR Unhold 12/8/18 1607)   ondansetron (ZOFRAN) injection 4 mg ( Intravenous MAR Unhold 12/8/18 1607)   phenazopyridine (PYRIDIUM) tablet 200 mg (not administered)   oxybutynin (DITROPAN) tablet 5 mg (not administered)   cefTRIAXone (ROCEPHIN) 1,000 mg in dextrose 5 % 50 mL IVPB (not administered)   oxyCODONE-acetaminophen (PERCOCET) 5-325 mg per tablet 1 tablet (not administered)   oxyCODONE-acetaminophen (PERCOCET) 5-325 mg per tablet 2 tablet (not administered)   ondansetron (ZOFRAN) injection 4 mg (4 mg Intravenous Given 12/8/18 1103)   lidocaine (XYLOCAINE) 1 % 150 mg in dextrose 5 % 50 mL IVPB (150 mg Intravenous Given 12/8/18 5581)   fentanyl citrate (PF) 100 MCG/2ML 50 mcg (50 mcg Intravenous Given 12/8/18 0429)   sodium chloride 0 9 % bolus 1,000 mL (0 mL Intravenous Stopped 12/8/18 0621)   metoclopramide (REGLAN) injection 10 mg (10 mg Intravenous Given 12/8/18 0529)   fentanyl citrate (PF) 100 MCG/2ML 50 mcg (50 mcg Intravenous Given 12/8/18 0621)   ceftriaxone (ROCEPHIN) 1 g/50 mL in dextrose IVPB (1,000 mg Intravenous New Bag 12/8/18 0638)       Diagnostic Studies  Results Reviewed     Procedure Component Value Units Date/Time    CBC and differential [[id number]]  (Abnormal) Resulted:  12/08/18 1049    Lab Status:  Final result Specimen:  Blood Updated:  12/08/18 1049     WBC 11 14 (H) Thousand/uL      RBC 4 69 Million/uL      Hemoglobin 13 9 g/dL      Hematocrit 43 5 %      MCV 93 fL      MCH 29 6 pg      MCHC 32 0 g/dL      RDW 13 0 %      MPV 10 5 fL      Platelets 060 Thousands/uL      nRBC 0 /100 WBCs      Neutrophils Relative 88 (H) %      Immat GRANS % 0 %      Lymphocytes Relative 9 (L) %      Monocytes Relative 3 (L) %      Eosinophils Relative 0 %      Basophils Relative 0 %      Neutrophils Absolute 9 77 (H) Thousands/µL      Immature Grans Absolute 0 03 Thousand/uL      Lymphocytes Absolute 1 03 Thousands/µL      Monocytes Absolute 0 28 Thousand/µL      Eosinophils Absolute 0 00 Thousand/µL      Basophils Absolute 0 03 Thousands/µL     Urine Microscopic [[id number]]  (Abnormal) Collected:  12/08/18 0536    Lab Status:  Final result Specimen:  Urine from Urine, Clean Catch Updated:  12/08/18 0628     RBC, UA 30-50 (A) /hpf      WBC, UA Innumerable (A) /hpf      Epithelial Cells Moderate (A) /hpf      Bacteria, UA Innumerable (A) /hpf      Hyaline Casts, UA 5-10 (A) /lpf     Urine culture [[id number]] Collected:  12/08/18 0536    Lab Status:   In process Specimen:  Urine from Urine, Clean Catch Updated:  12/08/18 0628    POCT urinalysis dipstick [[id number]]  (Normal) Resulted:  12/08/18 0537    Lab Status:  Final result Specimen:  Urine Updated:  12/08/18 0537     Color, UA yellow    POCT pregnancy, urine [[id number]]  (Normal) Resulted:  12/08/18 0537    Lab Status:  Final result Updated:  12/08/18 0537     EXT PREG TEST UR (Ref: Negative) negative    ED Urine Macroscopic [[id number]]  (Abnormal) Collected:  12/08/18 0536    Lab Status:  Final result Specimen:  Urine Updated:  12/08/18 0536     Color, UA Yellow     Clarity, UA Turbid     pH, UA 6 0     Leukocytes, UA Small (A)     Nitrite, UA Positive (A)     Protein,  (2+) (A) mg/dl      Glucose, UA Negative mg/dl      Ketones, UA Negative mg/dl      Urobilinogen, UA 0 2 E U /dl      Bilirubin, UA Negative     Blood, UA Moderate (A)     Specific Gravity, UA >=1 030    Narrative:       CLINITEK RESULT    Basic metabolic panel [[id number]] Collected:  12/08/18 0353    Lab Status:  Final result Specimen:  Blood from Arm, Right Updated:  12/08/18 0423     Sodium 141 mmol/L      Potassium 3 5 mmol/L      Chloride 107 mmol/L      CO2 28 mmol/L      ANION GAP 6 mmol/L      BUN 15 mg/dL      Creatinine 1 26 mg/dL      Glucose 116 mg/dL      Calcium 8 5 mg/dL      eGFR 54 ml/min/1 73sq m     Narrative:         National Kidney Disease Education Program recommendations are as follows:  GFR calculation is accurate only with a steady state creatinine  Chronic Kidney disease less than 60 ml/min/1 73 sq  meters  Kidney failure less than 15 ml/min/1 73 sq  meters  CT renal stone study abdomen pelvis without contrast   Final Result by Bladimir Quinones MD (12/08 8605)      9 mm right UVJ calculus with mild right hydronephrosis  Bilateral nonobstructing renal calculi  Workstation performed: [id number]         FL retrograde pyelogram    (Results Pending)         Procedures  Procedures      Phone Consults  ED Phone Contact    ED Course  ED Course as of Dec 08 1801   Sat Dec 08, 2018   0423 Bedside ultrasound reveals mild hydronephrosis on the right kidney compared to the left  Bladder is decompressed                                  MDM  Number of Diagnoses or Management Options  Right nephrolithiasis:   UTI (urinary tract infection):   Diagnosis management comments: 77-year-old female with a history recurrent large kidney stones presenting to the emergency department with a 12 x 9 cm stone at the right UPJ with concurrent urinary tract infection  Given ceftriaxone in the emergency department as well as multiple doses fentanyl and nausea medications to control her symptoms  The case was discussed with Dr Kristie Mendoza from Urology who accepted the patient for admission and further management  CritCare Time    Disposition  Final diagnoses:   Right nephrolithiasis   UTI (urinary tract infection)     Time reflects when diagnosis was documented in both MDM as applicable and the Disposition within this note     Time User Action Codes Description Comment    12/8/2018  6:39 AM Bridgette Sutton Add [N20 0] Right nephrolithiasis     12/8/2018  6:39 AM Bridgette Sutton Add [N39 0] UTI (urinary tract infection)       ED Disposition     ED Disposition Condition Comment    Admit  Case was discussed with Urology and the patient's admission status was agreed to be Admission Status: inpatient status to the service of Dr Kristie Mendoza   Follow-up Information    None         Current Discharge Medication List      CONTINUE these medications which have NOT CHANGED    Details   ibuprofen (MOTRIN) 400 mg tablet Take 400 mg by mouth every 6 (six) hours as needed for mild pain (takes 1-2 as needed)      oxybutynin (DITROPAN) 5 mg tablet Take 1 tablet (5 mg total) by mouth 3 (three) times a day as needed (Bladder spasms)  Qty: 30 tablet, Refills: 3    Associated Diagnoses: Left nephrolithiasis; UTI (urinary tract infection); Hydronephrosis; Left ureteral stone           No discharge procedures on file  ED Provider  Attending physically available and evaluated Dave Chandler I managed the patient along with the ED Attending      Electronically Signed by         Mathew Chiang MD  12/08/18 [facility]

## 2018-12-09 VITALS
RESPIRATION RATE: 16 BRPM | HEIGHT: 61 IN | OXYGEN SATURATION: 95 % | HEART RATE: 78 BPM | SYSTOLIC BLOOD PRESSURE: 112 MMHG | TEMPERATURE: 98.5 F | WEIGHT: 220 LBS | BODY MASS INDEX: 41.54 KG/M2 | DIASTOLIC BLOOD PRESSURE: 56 MMHG

## 2018-12-09 LAB
BACTERIA UR CULT: ABNORMAL
BASOPHILS # BLD AUTO: 0.01 THOUSANDS/ΜL (ref 0–0.1)
BASOPHILS NFR BLD AUTO: 0 % (ref 0–1)
EOSINOPHIL # BLD AUTO: 0 THOUSAND/ΜL (ref 0–0.61)
EOSINOPHIL NFR BLD AUTO: 0 % (ref 0–6)
ERYTHROCYTE [DISTWIDTH] IN BLOOD BY AUTOMATED COUNT: 13 % (ref 11.6–15.1)
HCT VFR BLD AUTO: 39.7 % (ref 34.8–46.1)
HGB BLD-MCNC: 12.7 G/DL (ref 11.5–15.4)
IMM GRANULOCYTES # BLD AUTO: 0.05 THOUSAND/UL (ref 0–0.2)
IMM GRANULOCYTES NFR BLD AUTO: 0 % (ref 0–2)
LYMPHOCYTES # BLD AUTO: 1.37 THOUSANDS/ΜL (ref 0.6–4.47)
LYMPHOCYTES NFR BLD AUTO: 9 % (ref 14–44)
MCH RBC QN AUTO: 29.6 PG (ref 26.8–34.3)
MCHC RBC AUTO-ENTMCNC: 32 G/DL (ref 31.4–37.4)
MCV RBC AUTO: 93 FL (ref 82–98)
MONOCYTES # BLD AUTO: 0.47 THOUSAND/ΜL (ref 0.17–1.22)
MONOCYTES NFR BLD AUTO: 3 % (ref 4–12)
NEUTROPHILS # BLD AUTO: 12.79 THOUSANDS/ΜL (ref 1.85–7.62)
NEUTS SEG NFR BLD AUTO: 88 % (ref 43–75)
NRBC BLD AUTO-RTO: 0 /100 WBCS
PLATELET # BLD AUTO: 241 THOUSANDS/UL (ref 149–390)
PMV BLD AUTO: 11.1 FL (ref 8.9–12.7)
RBC # BLD AUTO: 4.29 MILLION/UL (ref 3.81–5.12)
WBC # BLD AUTO: 14.69 THOUSAND/UL (ref 4.31–10.16)

## 2018-12-09 PROCEDURE — 85025 COMPLETE CBC W/AUTO DIFF WBC: CPT | Performed by: UROLOGY

## 2018-12-09 RX ORDER — OXYCODONE HYDROCHLORIDE AND ACETAMINOPHEN 5; 325 MG/1; MG/1
1 TABLET ORAL EVERY 6 HOURS PRN
Qty: 24 TABLET | Refills: 0 | Status: SHIPPED | OUTPATIENT
Start: 2018-12-09 | End: 2018-12-19

## 2018-12-09 RX ORDER — OXYBUTYNIN CHLORIDE 5 MG/1
5 TABLET ORAL 3 TIMES DAILY PRN
Qty: 20 TABLET | Refills: 0 | Status: SHIPPED | OUTPATIENT
Start: 2018-12-09 | End: 2019-02-05 | Stop reason: SDUPTHER

## 2018-12-09 RX ORDER — LEVOFLOXACIN 750 MG/1
750 TABLET ORAL EVERY 24 HOURS
Qty: 14 TABLET | Refills: 0 | Status: SHIPPED | OUTPATIENT
Start: 2018-12-09 | End: 2018-12-16

## 2018-12-09 RX ADMIN — SODIUM CHLORIDE, SODIUM LACTATE, POTASSIUM CHLORIDE, AND CALCIUM CHLORIDE 125 ML/HR: .6; .31; .03; .02 INJECTION, SOLUTION INTRAVENOUS at 00:49

## 2018-12-09 RX ADMIN — OXYCODONE HYDROCHLORIDE AND ACETAMINOPHEN 2 TABLET: 5; 325 TABLET ORAL at 00:37

## 2018-12-09 RX ADMIN — CEFTRIAXONE SODIUM 1000 MG: 10 INJECTION, POWDER, FOR SOLUTION INTRAVENOUS at 05:18

## 2018-12-09 RX ADMIN — OXYCODONE HYDROCHLORIDE AND ACETAMINOPHEN 2 TABLET: 5; 325 TABLET ORAL at 05:16

## 2018-12-09 NOTE — DISCHARGE SUMMARY
Discharge Summary - Marsh Gitelman 45 y o  female MRN: [id number]    [facility] Encounter: [id number]    Admission Date: 12/8/2018     Admitting Diagnosis: Kidney stone [N20 0]  UTI (urinary tract infection) [N39 0]  Right nephrolithiasis [N20 0]    Discharge Date:  December 9, 2018    HPI:  Admitted for 10-12 mm right renal stone with possible infection  Prior history of stones also  Procedures Performed: No orders of the defined types were placed in this encounter  Right ureteral stent placement    Hospital Course:  Underwent Stent placement day of admission  Procedure went well  Afebrile since surgery, feels much better on postop day one  Abdomen is soft nontender  Only issue is white blood count went up to 14,000, but I I feel she is ready for further treatment as an outpatient  Both her and her boyfriend know that it is very important to take her temperature 3 times per day, monitor how she feels  If she  gets sick, high fevers, she must come right back to the ER  She answers understands this and says she will comply  Levaquin once per day pending culture  She knows we might have to change antibiotic if the culture comes back with the different sensitivity  Discharge Diagnosis:  Right renal stone  1  Right nephrolithiasis    2  UTI (urinary tract infection)    3  Left nephrolithiasis    4  Hydronephrosis    5  Left ureteral stone    6  Ureteral calculus, left        Condition at Discharge: good     Discharge Medications:  See after visit summary for reconciled discharge medications provided to patient and family  Discharge instructions/Information to patient and family:   See after visit summary for information provided to patient and family  Provisions for Follow-Up Care:  See after visit summary for information related to follow-up care and any pertinent home health orders        Disposition: Home    Planned Readmission: No    Discharge Statement   I spent 30 minutes discharging the patient  This time was spent on the day of discharge  I had direct contact with the patient on the day of discharge  Additional documentation is required if more than 30 minutes were spent on discharge       Signature:   Darlene Manning MD  Date: 12/9/2018 Time: 9:06 AM

## 2018-12-09 NOTE — DISCHARGE INSTRUCTIONS
ALL YOUR  PREVIOUS MEDS ARE THE SAME  NEW MEDS:  Levaquin once per day, oxybutynin if needed for bladder spasm, Percocet one pill every six or 8 hours if needed for pain  TRY TO USE ONLY TYLENOL OR MOTRIN FOR PAIN  BE CAREFUL NOT TO PULL THE STRING  EXPECT SOME BLOOD IN URINE, BURNING, FREQUENT URINATION

## 2018-12-09 NOTE — UTILIZATION REVIEW
[address]  Utilization Review Department  Phone: [phone]; Fax [phone]  [email or web address]  ATTENTION: Please call with any questions or concerns to [phone]  and carefully listen to the prompts so that you are directed to the right person  Send all requests for admission clinical reviews, approved or denied determinations and any other requests to fax [phone]  All voicemails are confidential     Initial Clinical Review    Admission: Date/Time/Statement: 12/8/18 @ 0640     Orders Placed This Encounter   Procedures    Inpatient Admission (expected length of stay for this patient is greater than two midnights)     Standing Status:   Standing     Number of Occurrences:   1     Order Specific Question:   Admitting Physician     Answer:   Will Given [[id number]]     Order Specific Question:   Level of Care     Answer:   Med Surg [16]     Order Specific Question:   Estimated length of stay     Answer:   More than 2 Midnights     Order Specific Question:   Certification     Answer:   I certify that inpatient services are medically necessary for this patient for a duration of greater than two midnights  See H&P and MD Progress Notes for additional information about the patient's course of treatment  ED: Date/Time/Mode of Arrival:   ED Arrival Information     Expected Arrival Acuity Means of Arrival Escorted By Service Admission Type    - 12/8/2018 03:28 Urgent Walk-In Self Urology Urgent    Arrival Complaint    Kidney Stones          Chief Complaint:   Chief Complaint   Patient presents with    Flank Pain     right sided flank pain, started about 1 hour ago, vomiting x 1, States feels like previous kidney stone  History of Illness:  This is a 70-year-old female with a history of recurrent kidney stones requiring lithotripsy in past presenting to the emergency department with sudden onset of right flank pain that woke her from sleep in the middle the night this evening  She works that she woke up felt nauseous vomited multiple times  She then came straight to the emergency department  She describes the pain is a sharp, cramping pain in her right flank that is non radiating  She denies any aggravating or alleviating factors  She took Motrin at home but vomited it back up immediately  ED Vital Signs:   ED Triage Vitals   Temperature Pulse Respirations Blood Pressure SpO2   12/08/18 0333 12/08/18 0333 12/08/18 0333 12/08/18 0333 12/08/18 0333   (!) 96 7 °F (35 9 °C) 71 18 154/81 98 %      Temp Source Heart Rate Source Patient Position - Orthostatic VS BP Location FiO2 (%)   12/08/18 0836 12/08/18 0445 12/08/18 0836 12/08/18 0836 12/08/18 1440   Oral Monitor Lying Right arm 100      Pain Score       12/08/18 0333       Worst Possible Pain        Wt Readings from Last 1 Encounters:   12/08/18 99 8 kg (220 lb)       Vital Signs (abnormal): HR 52, RR 33, /49    Abnormal Labs/Diagnostic Test Results: WBC 11 14   Ref Range & Units 12/08/18 0536   Color, UA  Yellow    Clarity, UA  Turbid    pH, UA 4 5 - 8 0 6 0    Leukocytes, UA Negative Small     Nitrite, UA Negative Positive     Protein, UA Negative mg/dl 100 (2+)     Glucose, UA Negative mg/dl Negative    Ketones, UA Negative mg/dl Negative    Urobilinogen, UA 0 2, 1 0 E U /dl E U /dl 0 2    Bilirubin, UA Negative Negative    Blood, UA Negative Moderate     Specific [place], UA 1 003 - 1 030 >=1 030      CT a/p -- 9 mm right UVJ calculus with mild right hydronephrosis  Bilateral nonobstructing renal calculi         ED Treatment:   Medication Administration from 12/08/2018 0328 to 12/08/2018 0747       Date/Time Order Dose Route Action     12/08/2018 0353 ondansetron (ZOFRAN) injection 4 mg 4 mg Intravenous Given     12/08/2018 0449 lidocaine (XYLOCAINE) 1 % 150 mg in dextrose 5 % 50 mL IVPB 150 mg Intravenous Given     12/08/2018 0429 fentanyl citrate (PF) 100 MCG/2ML 50 mcg 50 mcg Intravenous Given     12/08/2018 0430 sodium chloride 0 9 % bolus 1,000 mL 1,000 mL Intravenous New Bag     12/08/2018 0529 metoclopramide (REGLAN) injection 10 mg 10 mg Intravenous Given     12/08/2018 0621 fentanyl citrate (PF) 100 MCG/2ML 50 mcg 50 mcg Intravenous Given     12/08/2018 0638 ceftriaxone (ROCEPHIN) 1 g/50 mL in dextrose IVPB 1,000 mg Intravenous New Bag       Past Medical/Surgical History: Active Ambulatory Problems     Diagnosis Date Noted    Hydronephrosis 07/10/2018    Left nephrolithiasis 07/10/2018    Left ureteral stone 07/10/2018    Ureteral calculus, left 07/23/2018     Past Medical History:   Diagnosis Date    Anxiety     Kidney stones     Obesity     Teeth missing        Admitting Diagnosis: Kidney stone [N20 0]  UTI (urinary tract infection) [N39 0]  Right nephrolithiasis [N20 0]    Age/Sex: 45 y o  female    Assessment/Plan:   Jeanna Ruiz is a 45 y o  female with  pain, nausea vomiting, starting  overnight  CT shows 12 mm stone right pelvis with mild hydro  Urine shows numerous white cells and red cells, bacteria  I plan  cysto, right ureteral stent  On ceftriaxone  Potential risks and complications discussed, and informed consent was given by the patient      Admission Orders:  Scheduled Meds:   lactated ringers 125 mL/hr Intravenous Continuous   morphine injection 4 mg Intravenous Q2H PRN   ondansetron 4 mg Intravenous Q6H PRN   oxybutynin 5 mg Oral BID   oxyCODONE-acetaminophen 1 tablet Oral Q4H PRN   oxyCODONE-acetaminophen 2 tablet Oral Q4H PRN   phenazopyridine 200 mg Oral TID PRN     Npo -->OR  Post-op:  LR @ 125 ml/hr  Ceftriaxone IV  Reg diet  SCD's  IS q 1h  Up as angella  Percocet 2 tabs po x2      OPERATIVE REPORT  SURGERY DATE: 12/8/2018    Procedure(s) (LRB):  CYSTOSCOPY RETROGRADE PYELOGRAM WITH INSERTION STENT URETERAL (Right)    Anesthesia Type:   General/LMA     Operative Indications:  Right nephrolithiasis [N20 0]      Operative Findings:   Moderate hydronephrosis from stone at [facility]     Complications:   None

## 2018-12-10 ENCOUNTER — TELEPHONE (OUTPATIENT)
Dept: UROLOGY | Facility: AMBULATORY SURGERY CENTER | Age: 38
End: 2018-12-10

## 2018-12-10 PROBLEM — N20.0 KIDNEY STONES: Status: ACTIVE | Noted: 2018-12-10

## 2018-12-10 NOTE — TELEPHONE ENCOUNTER
Patient was seen in ER and had surgery on 12/08/18 with Dr Heidi Wallis  Patient is a patient of Dr Carrington Aviles  She needs follow up appointment   Please advise and call back

## 2018-12-10 NOTE — TELEPHONE ENCOUNTER
I spoke to the patient and confirmed that her procedure will be 12/18/18 @ [facility] - CLOSED  Verbal instructions given  Written instructions mailed to her home address

## 2018-12-11 ENCOUNTER — TELEPHONE (OUTPATIENT)
Dept: UROLOGY | Facility: MEDICAL CENTER | Age: 38
End: 2018-12-11

## 2018-12-11 NOTE — TELEPHONE ENCOUNTER
Phone call from Daysi Ross in [facility], patients [address] ended as of 9/30/18, no pending MA accounts in [facility] as of today  Daysi Ross is aware that the procedure is medically necessary and we can not cancel  Daysi Ross sent the account to the [employer]  I left a voice mail message for the patient to call my direct number to see if she has a private insurance that is not listed

## 2018-12-12 NOTE — H&P
HISTORY AND PHYSICAL  ? ? Patient Name: Hannah Boyer  Patient MRN: [id number]  Attending Provider: Mabelene Kocher, MD  Service: Urology  Chief Complaint    Right renal calculus      HPI   Hannah Boyer is a 45 y o  female with  stent placed last week due to obstruction, urinary tract infection  I plan  cysto, right ureteroscopy, laser stone, stent replacement  Potential risks and complications discussed, and informed consent was given by the patient  Medications  Meds/Allergies     No current facility-administered medications for this encounter  Cannot display prior to admission medications because the patient has not been admitted in this contact  No current facility-administered medications for this encounter       Current Outpatient Prescriptions:     ibuprofen (MOTRIN) 400 mg tablet, Take 400 mg by mouth every 6 (six) hours as needed for mild pain (takes 1-2 as needed), Disp: , Rfl:     levofloxacin (LEVAQUIN) 750 mg tablet, Take 1 tablet (750 mg total) by mouth every 24 hours for 7 days, Disp: 14 tablet, Rfl: 0    oxybutynin (DITROPAN) 5 mg tablet, Take 1 tablet (5 mg total) by mouth 3 (three) times a day as needed (Bladder spasms), Disp: 20 tablet, Rfl: 0    oxyCODONE-acetaminophen (PERCOCET) 5-325 mg per tablet, Take 1 tablet by mouth every 6 (six) hours as needed for moderate pain for up to 10 days Max Daily Amount: 4 tablets, Disp: 24 tablet, Rfl: 0  Review of Systems  10 point review of systems negative except as noted in HPI  Allergies  No Known Allergies  PMH  Past Medical History:   Diagnosis Date    Anxiety     not currently on meds    Kidney stones     Obesity     Teeth missing      Past surgical history  Past Surgical History:   Procedure Laterality Date    FL RETROGRADE PYELOGRAM  12/8/2018    OK CYSTO/URETERO W/LITHOTRIPSY &INDWELL STENT INSRT Left 8/1/2018    Procedure: CYSTOSCOPY URETEROSCOPY WITH LITHOTRIPSY HOLMIUM LASER,  AND LEFT URETERAL STENT EXCHANGE;  Surgeon: Sherryle Alice, MD;  Location: [facility];  Service: Urology    [place] CYSTOURETHROSCOPY,URETER CATHETER Left 7/11/2018    Procedure: CYSTOSCOPY WITH INSERTION LEFT STENT URETERAL;  Surgeon: Sherryle Alice, MD;  [facility];  Service: Urology    [place] CYSTOURETHROSCOPY,URETER CATHETER Right 12/8/2018    Procedure: CYSTOSCOPY RETROGRADE PYELOGRAM WITH INSERTION STENT URETERAL;  Surgeon: Angeli Matthews MD;  [facility];  Service: Urology    TUBAL LIGATION  2007     Social history  Social History   Substance Use Topics    Smoking status: Current Every Day Smoker     Packs/day: 1 00     Years: 23 00     Types: Cigarettes    Smokeless tobacco: Never Used    Alcohol use Yes      Comment: occasionally     ? Physical Exam  General appearance: alert and oriented, in no acute distress  Head: Normocephalic, without obvious abnormality, atraumatic  Neck: no adenopathy, no carotid bruit, no JVD, supple, symmetrical, trachea midline and thyroid not enlarged, symmetric, no tenderness/mass/nodules  Back: symmetric, no curvature  ROM normal  No CVA tenderness    Lungs: clear to auscultation bilaterally  Heart: regular rate and rhythm, S1, S2 normal, no murmur, click, rub or gallop  Abdomen: soft, non-tender; bowel sounds normal; no masses,  no organomegaly  Extremities: extremities normal, warm and well-perfused; no cyanosis, clubbing, or edema  Pulses: 2+ and symmetric  Lymph nodes: Cervical, supraclavicular, and axillary nodes normal   Angeli Matthews MD

## 2018-12-14 RX ORDER — CALCIUM CARBONATE 200(500)MG
1 TABLET,CHEWABLE ORAL AS NEEDED
COMMUNITY

## 2018-12-14 NOTE — PRE-PROCEDURE INSTRUCTIONS
Pre-Surgery Instructions:   Medication Instructions    calcium carbonate (TUMS) 500 mg chewable tablet Patient was instructed by Physician and understands   ibuprofen (MOTRIN) 400 mg tablet Patient was instructed by Physician and understands   levofloxacin (LEVAQUIN) 750 mg tablet Patient was instructed by Physician and understands   oxybutynin (DITROPAN) 5 mg tablet Patient was instructed by Physician and understands   oxyCODONE-acetaminophen (PERCOCET) 5-325 mg per tablet Patient was instructed by Physician and understands  [facility] preop instructions reviewed with pt  Pt will get dial antibacterial soap

## 2018-12-17 ENCOUNTER — ANESTHESIA EVENT (OUTPATIENT)
Dept: PERIOP | Facility: HOSPITAL | Age: 38
End: 2018-12-17

## 2018-12-18 ENCOUNTER — HOSPITAL ENCOUNTER (OUTPATIENT)
Facility: HOSPITAL | Age: 38
Setting detail: OUTPATIENT SURGERY
Discharge: HOME | End: 2018-12-18
Attending: UROLOGY | Admitting: UROLOGY

## 2018-12-18 ENCOUNTER — APPOINTMENT (OUTPATIENT)
Dept: RADIOLOGY | Facility: HOSPITAL | Age: 38
End: 2018-12-18

## 2018-12-18 ENCOUNTER — ANESTHESIA (OUTPATIENT)
Dept: PERIOP | Facility: HOSPITAL | Age: 38
End: 2018-12-18

## 2018-12-18 VITALS
WEIGHT: 220 LBS | BODY MASS INDEX: 41.54 KG/M2 | DIASTOLIC BLOOD PRESSURE: 58 MMHG | HEART RATE: 76 BPM | HEIGHT: 61 IN | OXYGEN SATURATION: 100 % | RESPIRATION RATE: 16 BRPM | SYSTOLIC BLOOD PRESSURE: 121 MMHG | TEMPERATURE: 98.4 F

## 2018-12-18 DIAGNOSIS — N20.0 KIDNEY STONES: Primary | ICD-10-CM

## 2018-12-18 LAB — EXT PREGNANCY TEST URINE: NEGATIVE

## 2018-12-18 PROCEDURE — C1894 INTRO/SHEATH, NON-LASER: HCPCS | Performed by: UROLOGY

## 2018-12-18 PROCEDURE — 52356 CYSTO/URETERO W/LITHOTRIPSY: CPT | Performed by: UROLOGY

## 2018-12-18 PROCEDURE — 87086 URINE CULTURE/COLONY COUNT: CPT | Performed by: UROLOGY

## 2018-12-18 PROCEDURE — C2617 STENT, NON-COR, TEM W/O DEL: HCPCS | Performed by: UROLOGY

## 2018-12-18 PROCEDURE — 76000 FLUOROSCOPY <1 HR PHYS/QHP: CPT

## 2018-12-18 PROCEDURE — 81025 URINE PREGNANCY TEST: CPT | Performed by: ANESTHESIOLOGY

## 2018-12-18 PROCEDURE — C1769 GUIDE WIRE: HCPCS | Performed by: UROLOGY

## 2018-12-18 DEVICE — STENT CONTOUR INJ 6FR 30CM: Type: IMPLANTABLE DEVICE | Site: URETER | Status: FUNCTIONAL

## 2018-12-18 RX ORDER — NEOSTIGMINE METHYLSULFATE 1 MG/ML
INJECTION INTRAVENOUS AS NEEDED
Status: DISCONTINUED | OUTPATIENT
Start: 2018-12-18 | End: 2018-12-18 | Stop reason: SURG

## 2018-12-18 RX ORDER — KETOROLAC TROMETHAMINE 30 MG/ML
30 INJECTION, SOLUTION INTRAMUSCULAR; INTRAVENOUS ONCE
Status: DISCONTINUED | OUTPATIENT
Start: 2018-12-18 | End: 2018-12-18

## 2018-12-18 RX ORDER — KETOROLAC TROMETHAMINE 30 MG/ML
30 INJECTION, SOLUTION INTRAMUSCULAR; INTRAVENOUS ONCE
Status: COMPLETED | OUTPATIENT
Start: 2018-12-18 | End: 2018-12-18

## 2018-12-18 RX ORDER — ONDANSETRON 2 MG/ML
4 INJECTION INTRAMUSCULAR; INTRAVENOUS ONCE AS NEEDED
Status: COMPLETED | OUTPATIENT
Start: 2018-12-18 | End: 2018-12-18

## 2018-12-18 RX ORDER — DEXAMETHASONE SODIUM PHOSPHATE 4 MG/ML
INJECTION, SOLUTION INTRA-ARTICULAR; INTRALESIONAL; INTRAMUSCULAR; INTRAVENOUS; SOFT TISSUE AS NEEDED
Status: DISCONTINUED | OUTPATIENT
Start: 2018-12-18 | End: 2018-12-18 | Stop reason: SURG

## 2018-12-18 RX ORDER — OXYCODONE HYDROCHLORIDE AND ACETAMINOPHEN 5; 325 MG/1; MG/1
1 TABLET ORAL EVERY 4 HOURS PRN
Status: DISCONTINUED | OUTPATIENT
Start: 2018-12-18 | End: 2018-12-18 | Stop reason: HOSPADM

## 2018-12-18 RX ORDER — ROCURONIUM BROMIDE 10 MG/ML
INJECTION, SOLUTION INTRAVENOUS AS NEEDED
Status: DISCONTINUED | OUTPATIENT
Start: 2018-12-18 | End: 2018-12-18 | Stop reason: SURG

## 2018-12-18 RX ORDER — FENTANYL CITRATE/PF 50 MCG/ML
50 SYRINGE (ML) INJECTION
Status: COMPLETED | OUTPATIENT
Start: 2018-12-18 | End: 2018-12-18

## 2018-12-18 RX ORDER — OXYCODONE HYDROCHLORIDE AND ACETAMINOPHEN 5; 325 MG/1; MG/1
2 TABLET ORAL EVERY 4 HOURS PRN
Status: DISCONTINUED | OUTPATIENT
Start: 2018-12-18 | End: 2018-12-18 | Stop reason: HOSPADM

## 2018-12-18 RX ORDER — GLYCOPYRROLATE 0.2 MG/ML
INJECTION INTRAMUSCULAR; INTRAVENOUS AS NEEDED
Status: DISCONTINUED | OUTPATIENT
Start: 2018-12-18 | End: 2018-12-18 | Stop reason: SURG

## 2018-12-18 RX ORDER — HYDROCODONE BITARTRATE AND ACETAMINOPHEN 5; 325 MG/1; MG/1
1-2 TABLET ORAL EVERY 6 HOURS PRN
Qty: 20 TABLET | Refills: 0 | Status: SHIPPED | OUTPATIENT
Start: 2018-12-18 | End: 2018-12-28

## 2018-12-18 RX ORDER — MIDAZOLAM HYDROCHLORIDE 1 MG/ML
INJECTION INTRAMUSCULAR; INTRAVENOUS AS NEEDED
Status: DISCONTINUED | OUTPATIENT
Start: 2018-12-18 | End: 2018-12-18 | Stop reason: SURG

## 2018-12-18 RX ORDER — MAGNESIUM HYDROXIDE 1200 MG/15ML
LIQUID ORAL AS NEEDED
Status: DISCONTINUED | OUTPATIENT
Start: 2018-12-18 | End: 2018-12-18 | Stop reason: HOSPADM

## 2018-12-18 RX ORDER — LEVOFLOXACIN 5 MG/ML
750 INJECTION, SOLUTION INTRAVENOUS ONCE
Status: COMPLETED | OUTPATIENT
Start: 2018-12-18 | End: 2018-12-18

## 2018-12-18 RX ORDER — OXYBUTYNIN CHLORIDE 5 MG/1
TABLET ORAL
Qty: 15 TABLET | Refills: 0 | Status: SHIPPED | OUTPATIENT
Start: 2018-12-18

## 2018-12-18 RX ORDER — FENTANYL CITRATE 50 UG/ML
INJECTION, SOLUTION INTRAMUSCULAR; INTRAVENOUS AS NEEDED
Status: DISCONTINUED | OUTPATIENT
Start: 2018-12-18 | End: 2018-12-18 | Stop reason: SURG

## 2018-12-18 RX ORDER — ONDANSETRON 2 MG/ML
INJECTION INTRAMUSCULAR; INTRAVENOUS AS NEEDED
Status: DISCONTINUED | OUTPATIENT
Start: 2018-12-18 | End: 2018-12-18 | Stop reason: SURG

## 2018-12-18 RX ORDER — PROPOFOL 10 MG/ML
INJECTION, EMULSION INTRAVENOUS AS NEEDED
Status: DISCONTINUED | OUTPATIENT
Start: 2018-12-18 | End: 2018-12-18 | Stop reason: SURG

## 2018-12-18 RX ORDER — SODIUM CHLORIDE 9 MG/ML
125 INJECTION, SOLUTION INTRAVENOUS CONTINUOUS
Status: DISCONTINUED | OUTPATIENT
Start: 2018-12-18 | End: 2018-12-18 | Stop reason: HOSPADM

## 2018-12-18 RX ORDER — ONDANSETRON 2 MG/ML
4 INJECTION INTRAMUSCULAR; INTRAVENOUS ONCE
Status: COMPLETED | OUTPATIENT
Start: 2018-12-18 | End: 2018-12-18

## 2018-12-18 RX ADMIN — LIDOCAINE HYDROCHLORIDE 100 MG: 20 INJECTION, SOLUTION INTRAVENOUS at 07:31

## 2018-12-18 RX ADMIN — DEXAMETHASONE SODIUM PHOSPHATE 4 MG: 4 INJECTION, SOLUTION INTRAMUSCULAR; INTRAVENOUS at 07:40

## 2018-12-18 RX ADMIN — ONDANSETRON 4 MG: 2 INJECTION INTRAMUSCULAR; INTRAVENOUS at 07:26

## 2018-12-18 RX ADMIN — LEVOFLOXACIN 750 MG: 5 INJECTION, SOLUTION INTRAVENOUS at 06:52

## 2018-12-18 RX ADMIN — FENTANYL CITRATE 50 MCG: 50 INJECTION, SOLUTION INTRAMUSCULAR; INTRAVENOUS at 09:01

## 2018-12-18 RX ADMIN — GLYCOPYRROLATE 0.6 MG: 0.2 INJECTION, SOLUTION INTRAMUSCULAR; INTRAVENOUS at 08:25

## 2018-12-18 RX ADMIN — KETOROLAC TROMETHAMINE 30 MG: 30 INJECTION, SOLUTION INTRAMUSCULAR at 12:05

## 2018-12-18 RX ADMIN — OXYCODONE HYDROCHLORIDE AND ACETAMINOPHEN 1 TABLET: 5; 325 TABLET ORAL at 10:21

## 2018-12-18 RX ADMIN — ROCURONIUM BROMIDE 30 MG: 10 INJECTION INTRAVENOUS at 07:31

## 2018-12-18 RX ADMIN — SODIUM CHLORIDE: 0.9 INJECTION, SOLUTION INTRAVENOUS at 08:17

## 2018-12-18 RX ADMIN — MIDAZOLAM 2 MG: 1 INJECTION INTRAMUSCULAR; INTRAVENOUS at 07:26

## 2018-12-18 RX ADMIN — ONDANSETRON 4 MG: 2 INJECTION INTRAMUSCULAR; INTRAVENOUS at 06:50

## 2018-12-18 RX ADMIN — SODIUM CHLORIDE 125 ML/HR: 0.9 INJECTION, SOLUTION INTRAVENOUS at 06:41

## 2018-12-18 RX ADMIN — OXYCODONE HYDROCHLORIDE AND ACETAMINOPHEN 1 TABLET: 5; 325 TABLET ORAL at 11:19

## 2018-12-18 RX ADMIN — ONDANSETRON 4 MG: 2 INJECTION INTRAMUSCULAR; INTRAVENOUS at 08:49

## 2018-12-18 RX ADMIN — NEOSTIGMINE METHYLSULFATE 3 MG: 1 INJECTION INTRAVENOUS at 08:25

## 2018-12-18 RX ADMIN — FENTANYL CITRATE 50 MCG: 50 INJECTION, SOLUTION INTRAMUSCULAR; INTRAVENOUS at 08:51

## 2018-12-18 RX ADMIN — SODIUM CHLORIDE 125 ML/HR: 0.9 INJECTION, SOLUTION INTRAVENOUS at 10:45

## 2018-12-18 RX ADMIN — PROPOFOL 200 MG: 10 INJECTION, EMULSION INTRAVENOUS at 07:31

## 2018-12-18 RX ADMIN — FENTANYL CITRATE 100 MCG: 50 INJECTION, SOLUTION INTRAMUSCULAR; INTRAVENOUS at 07:31

## 2018-12-18 NOTE — OP NOTE
OPERATIVE REPORT  PATIENT NAME: Raulito Alberto    :  1980  MRN: [id number]  Pt Location: [facility] ROOM 04    SURGERY DATE: 2018    Surgeon(s) and Role:     * Andie Diaz MD - Primary    Preop Diagnosis:  Kidney stones [N20 0]    Post-Op Diagnosis Codes:     * Kidney stones [N20 0]    Procedure(s) (LRB):  CYSTO URETEROSCOPY W/HOLMIUM LASER, RETROGRADE PYELOGRAM; STENT (Right)    Specimen(s):  ID Type Source Tests Collected by Time Destination   A : Urine Culture  Urine Urine, Cystoscopic URINE CULTURE Andie Diaz MD 2018 0800        Estimated Blood Loss:   Minimal    Drains:  Ureteral Drain/Stent Left ureter 6 Fr  (Active)   Number of days: 139       Ureteral Drain/Stent Right ureter 6 Fr  (Active)   Securement Method Other (Comment) 2018  8:36 AM   Number of days: 0       Anesthesia Type:   General    Operative Indications:  Kidney stones [N20 0]      Operative Findings:  10 mm stone right    Complications:   None    Procedure and Technique:      PLAN FOR STENT:  Remove occur a right by nurse next  with string      The patient was brought into the OR, properly identified and positioned on the table  General anesthesia was induced, and she was prepped using betadine solution and draped in lithotomy position  The 22F scope was introduced in the urethra, which showed no strictures  The bladder was inspected and had no lesions, stones, or tumors  There was mild inflammation from the prior stent  The right ureteral stent was grasped, withdrawn, and wired  A second wire was placed  up the ureter  The flexible scope was placed thru a sheath and advanced to stone,  in the mid renal pelvis  The Holmium laser was used on various settings to break up stone into small particles  Care was taken not to laser tissue  All particles appeared small enough to pass around the stent  The stone was quite hard, required lots of  laser energy to break up    There was a mild amount of bleeding, which did not seem excessive  The scope was removed from the ureter, and the cystoscope was used to place a 6F Contour VL stent in the ureter, with the upper coils in the renal pelvis, and the distal coil in the bladder  Retrograde pyelogram on that side confirmed good position and no extravasation of contrast      The patient was awaken from anesthesia and taken to the PACU in good condition       I was present for the entire procedure    Patient Disposition:  PACU     SIGNATURE: Abel Escamilla MD  DATE: December 18, 2018  TIME: 8:54 AM

## 2018-12-18 NOTE — ANESTHESIA POSTPROCEDURE EVALUATION
Post-Op Assessment Note      CV Status:  Stable    Mental Status:  Alert and awake    Hydration Status:  Euvolemic    PONV Controlled:  Controlled    Airway Patency:  Patent    Post Op Vitals Reviewed: Yes          Staff: Anesthesiologist           /75 (12/18/18 0836)    Temp 98 4 °F (36 9 °C) (12/18/18 0836)    Pulse 79 (12/18/18 0836)   Resp 16 (12/18/18 0836)    SpO2 100 % (12/18/18 0836)

## 2018-12-18 NOTE — DISCHARGE INSTRUCTIONS
ALL YOUR  PREVIOUS MEDS ARE THE SAME  NEW MEDS:  Pain meds, oxybutynin if needed for bladder pressure    BE CAREFUL NOT TO PULL THE STRING  EXPECT SOME BLOOD IN URINE, BURNING, FREQUENT URINATION

## 2018-12-18 NOTE — PERIOPERATIVE NURSING NOTE
Pt had episode on nausea/vomiting - spent approx 10 mins in bathroom - Dr Barrett Lu aware of vomiting - zofran given for vomiting, pt thinks it was from po antibx she took this morning at 5am   IV Levaquin antibx started

## 2018-12-18 NOTE — ANESTHESIA PREPROCEDURE EVALUATION
Review of Systems/Medical History  Patient summary reviewed  Chart reviewed  No history of anesthetic complications     Cardiovascular  EKG reviewed, Negative cardio ROS    Pulmonary  Smoker cigarette smoker  ,        GI/Hepatic  Negative GI/hepatic ROS   GERD well controlled,        Kidney stones,        Endo/Other    Obesity  morbid obesity   GYN  Negative gynecology ROS     Comment: S/p tubal ligation      Hematology  Negative hematology ROS      Musculoskeletal  Negative musculoskeletal ROS        Neurology  Negative neurology ROS   Headaches,    Psychology   Anxiety,              Physical Exam    Airway    Mallampati score: II  TM Distance: >3 FB  Neck ROM: full     Dental   No notable dental hx     Cardiovascular  Comment: Negative ROS, Rhythm: regular, Rate: normal,     Pulmonary  Breath sounds clear to auscultation,     Other Findings  Missing teeth      Anesthesia Plan  ASA Score- 3     Anesthesia Type- general with ASA Monitors  Additional Monitors:   Airway Plan: ETT  Comment: Vomiting in SDS preop  Zofran ordered  Plan Factors-Patient not instructed to abstain from smoking on day of procedure       Induction- intravenous and rapid sequence induction  Postoperative Plan- Plan for postoperative opioid use  Informed Consent- Anesthetic plan and risks discussed with patient

## 2018-12-20 LAB — BACTERIA UR CULT: NORMAL

## 2018-12-27 ENCOUNTER — TELEPHONE (OUTPATIENT)
Dept: UROLOGY | Facility: AMBULATORY SURGERY CENTER | Age: 38
End: 2018-12-27

## 2018-12-27 ENCOUNTER — DOCUMENTATION (OUTPATIENT)
Dept: UROLOGY | Facility: MEDICAL CENTER | Age: 38
End: 2018-12-27

## 2018-12-27 DIAGNOSIS — N20.0 KIDNEY STONE: Primary | ICD-10-CM

## 2018-12-27 NOTE — TELEPHONE ENCOUNTER
Spoke with patient, patient said she removed stent yesterday because it was coming out and wants to know if she needs to come in for an appointment  Patient is stuck in the [place] [address] because her car broke down, so please call her to reschedule if she needs it

## 2018-12-27 NOTE — TELEPHONE ENCOUNTER
Spoke with the patient; Per Dr Ian Miller, she can return for her next scheuduled visit with Dr Ciro Gage on 02/11/2019  Prior to her appointment, she is to have a stone risk profile  She's been instructed to do this at least two weeks before her visit  The patient is requesting a note to return to work for 12/31/2018  She would like it faxed to her work, but does not know the number  She'll call back with their fax number

## 2018-12-28 NOTE — TELEPHONE ENCOUNTER
Pt called back and requested letter to be emailed to [email or web address]  Emailed as requested

## 2018-12-28 NOTE — TELEPHONE ENCOUNTER
E mail did not go through, pt requested letter to be sent to her at [email or web address]  Sent  Pt to call if she doesn't receive

## 2022-12-13 NOTE — DISCHARGE SUMMARY
Discharge Summary - Raulito Alberto 45 y o  female MRN: [id number]    Admission Date: 12/18/2018     Admitting Diagnosis: Kidney stones [N20 0]    Procedures Performed:  Right ureteroscopy, stone laser, stent    Patient underwent planned outpt surgery and recovered without complication  Discharged in good condition  Medications were prescribed    Pt knows to call the office for followup in six days
ITCHING/REDNESS

## (undated) DEVICE — SCD SEQUENTIAL COMPRESSION COMFORT SLEEVE MEDIUM KNEE LENGTH: Brand: KENDALL SCD

## (undated) DEVICE — PACK TUR

## (undated) DEVICE — SHEATH URETERAL ACCESS 10/12FR 35CM PROXIS

## (undated) DEVICE — INVIEW CLEAR LEGGINGS: Brand: CONVERTORS

## (undated) DEVICE — 3M™ STERI-STRIP™ COMPOUND BENZOIN TINCTURE 40 BAGS/CARTON 4 CARTONS/CASE C1544: Brand: 3M™ STERI-STRIP™

## (undated) DEVICE — SPECIMEN CONTAINER STERILE PEEL PACK

## (undated) DEVICE — GUIDEWIRE ANGLE TIP 0.038 IN SOLO PLUS

## (undated) DEVICE — 3M™ TEGADERM™ TRANSPARENT FILM DRESSING FRAME STYLE, 1624W, 2-3/8 IN X 2-3/4 IN (6 CM X 7 CM), 100/CT 4CT/CASE: Brand: 3M™ TEGADERM™

## (undated) DEVICE — UROCATCH BAG

## (undated) DEVICE — STERILE CYSTO PACK: Brand: CARDINAL HEALTH

## (undated) DEVICE — LUBRICANT SURGILUBE TUBE 4 OZ  FLIP TOP

## (undated) DEVICE — TUBING SUCTION 5MM X 12 FT

## (undated) DEVICE — 3000CC GUARDIAN II: Brand: GUARDIAN

## (undated) DEVICE — GLOVE SRG BIOGEL 7

## (undated) DEVICE — GLOVE SRG BIOGEL 7.5

## (undated) DEVICE — ENDOSCOPIC VALVE WITH ADAPTER.: Brand: SURSEAL® II

## (undated) DEVICE — LASER FIBER HOLMIUM 272MICRON

## (undated) DEVICE — GUIDEWIRE STRGHT TIP 0.035 IN  SOLO PLUS

## (undated) DEVICE — SYRINGE 10ML LL

## (undated) DEVICE — CATH URETERAL 5FR X 70 CM FLEX TIP POLYUR BARD